# Patient Record
Sex: FEMALE | Race: WHITE | Employment: OTHER | URBAN - METROPOLITAN AREA
[De-identification: names, ages, dates, MRNs, and addresses within clinical notes are randomized per-mention and may not be internally consistent; named-entity substitution may affect disease eponyms.]

---

## 2020-05-06 ENCOUNTER — HOSPITAL ENCOUNTER (EMERGENCY)
Age: 60
Discharge: HOME | End: 2020-05-06
Attending: EMERGENCY MEDICINE
Payer: COMMERCIAL

## 2020-05-06 ENCOUNTER — APPOINTMENT (OUTPATIENT)
Dept: CT IMAGING | Age: 60
End: 2020-05-06
Attending: EMERGENCY MEDICINE
Payer: COMMERCIAL

## 2020-05-06 VITALS
BODY MASS INDEX: 32.44 KG/M2 | HEART RATE: 94 BPM | WEIGHT: 190 LBS | TEMPERATURE: 99 F | OXYGEN SATURATION: 99 % | RESPIRATION RATE: 18 BRPM | HEIGHT: 64 IN | SYSTOLIC BLOOD PRESSURE: 150 MMHG | DIASTOLIC BLOOD PRESSURE: 80 MMHG

## 2020-05-06 DIAGNOSIS — R10.32 ABDOMINAL PAIN, LLQ (LEFT LOWER QUADRANT): ICD-10-CM

## 2020-05-06 DIAGNOSIS — K57.92 DIVERTICULITIS: Primary | ICD-10-CM

## 2020-05-06 DIAGNOSIS — R11.0 NAUSEA WITHOUT VOMITING: ICD-10-CM

## 2020-05-06 LAB
ALBUMIN SERPL-MCNC: 3.9 G/DL (ref 3.5–5)
ALBUMIN/GLOB SERPL: 1.3 {RATIO} (ref 1.1–2.2)
ALP SERPL-CCNC: 95 U/L (ref 45–117)
ALT SERPL-CCNC: 24 U/L (ref 12–78)
ANION GAP SERPL CALC-SCNC: 5 MMOL/L (ref 5–15)
APPEARANCE UR: CLEAR
AST SERPL-CCNC: 17 U/L (ref 15–37)
BACTERIA URNS QL MICRO: NEGATIVE /HPF
BASOPHILS # BLD: 0.1 K/UL (ref 0–0.1)
BASOPHILS NFR BLD: 0 % (ref 0–1)
BILIRUB SERPL-MCNC: 0.8 MG/DL (ref 0.2–1)
BILIRUB UR QL: NEGATIVE
BUN SERPL-MCNC: 12 MG/DL (ref 6–20)
BUN/CREAT SERPL: 19 (ref 12–20)
CALCIUM SERPL-MCNC: 8.4 MG/DL (ref 8.5–10.1)
CHLORIDE SERPL-SCNC: 103 MMOL/L (ref 97–108)
CO2 SERPL-SCNC: 28 MMOL/L (ref 21–32)
COLOR UR: NORMAL
CREAT SERPL-MCNC: 0.64 MG/DL (ref 0.55–1.02)
DIFFERENTIAL METHOD BLD: ABNORMAL
EOSINOPHIL # BLD: 0.2 K/UL (ref 0–0.4)
EOSINOPHIL NFR BLD: 1 % (ref 0–7)
EPITH CASTS URNS QL MICRO: NORMAL /LPF
ERYTHROCYTE [DISTWIDTH] IN BLOOD BY AUTOMATED COUNT: 13.1 % (ref 11.5–14.5)
GLOBULIN SER CALC-MCNC: 3 G/DL (ref 2–4)
GLUCOSE SERPL-MCNC: 120 MG/DL (ref 65–100)
GLUCOSE UR STRIP.AUTO-MCNC: NEGATIVE MG/DL
HCT VFR BLD AUTO: 42.5 % (ref 35–47)
HGB BLD-MCNC: 14.2 G/DL (ref 11.5–16)
HGB UR QL STRIP: NEGATIVE
HYALINE CASTS URNS QL MICRO: NORMAL /LPF (ref 0–5)
IMM GRANULOCYTES # BLD AUTO: 0.1 K/UL (ref 0–0.04)
IMM GRANULOCYTES NFR BLD AUTO: 0 % (ref 0–0.5)
KETONES UR QL STRIP.AUTO: NEGATIVE MG/DL
LACTATE SERPL-SCNC: 0.5 MMOL/L (ref 0.4–2)
LEUKOCYTE ESTERASE UR QL STRIP.AUTO: NEGATIVE
LYMPHOCYTES # BLD: 0.9 K/UL (ref 0.8–3.5)
LYMPHOCYTES NFR BLD: 6 % (ref 12–49)
MCH RBC QN AUTO: 32.1 PG (ref 26–34)
MCHC RBC AUTO-ENTMCNC: 33.4 G/DL (ref 30–36.5)
MCV RBC AUTO: 95.9 FL (ref 80–99)
MONOCYTES # BLD: 1.3 K/UL (ref 0–1)
MONOCYTES NFR BLD: 9 % (ref 5–13)
NEUTS SEG # BLD: 13.1 K/UL (ref 1.8–8)
NEUTS SEG NFR BLD: 84 % (ref 32–75)
NITRITE UR QL STRIP.AUTO: NEGATIVE
NRBC # BLD: 0 K/UL (ref 0–0.01)
NRBC BLD-RTO: 0 PER 100 WBC
PH UR STRIP: 6 [PH] (ref 5–8)
PLATELET # BLD AUTO: 337 K/UL (ref 150–400)
PMV BLD AUTO: 9.6 FL (ref 8.9–12.9)
POTASSIUM SERPL-SCNC: 3.7 MMOL/L (ref 3.5–5.1)
PROT SERPL-MCNC: 6.9 G/DL (ref 6.4–8.2)
PROT UR STRIP-MCNC: NEGATIVE MG/DL
RBC # BLD AUTO: 4.43 M/UL (ref 3.8–5.2)
RBC #/AREA URNS HPF: NORMAL /HPF (ref 0–5)
SODIUM SERPL-SCNC: 136 MMOL/L (ref 136–145)
SP GR UR REFRACTOMETRY: 1 (ref 1–1.03)
UA: UC IF INDICATED,UAUC: NORMAL
UROBILINOGEN UR QL STRIP.AUTO: 0.2 EU/DL (ref 0.2–1)
WBC # BLD AUTO: 15.6 K/UL (ref 3.6–11)
WBC URNS QL MICRO: NORMAL /HPF (ref 0–4)

## 2020-05-06 PROCEDURE — 94640 AIRWAY INHALATION TREATMENT: CPT

## 2020-05-06 PROCEDURE — 83605 ASSAY OF LACTIC ACID: CPT

## 2020-05-06 PROCEDURE — 96375 TX/PRO/DX INJ NEW DRUG ADDON: CPT

## 2020-05-06 PROCEDURE — 36415 COLL VENOUS BLD VENIPUNCTURE: CPT

## 2020-05-06 PROCEDURE — 96366 THER/PROPH/DIAG IV INF ADDON: CPT

## 2020-05-06 PROCEDURE — 80053 COMPREHEN METABOLIC PANEL: CPT

## 2020-05-06 PROCEDURE — 74177 CT ABD & PELVIS W/CONTRAST: CPT

## 2020-05-06 PROCEDURE — 96368 THER/DIAG CONCURRENT INF: CPT

## 2020-05-06 PROCEDURE — 74011636320 HC RX REV CODE- 636/320: Performed by: EMERGENCY MEDICINE

## 2020-05-06 PROCEDURE — 85025 COMPLETE CBC W/AUTO DIFF WBC: CPT

## 2020-05-06 PROCEDURE — 81001 URINALYSIS AUTO W/SCOPE: CPT

## 2020-05-06 PROCEDURE — 96365 THER/PROPH/DIAG IV INF INIT: CPT

## 2020-05-06 PROCEDURE — 74011250636 HC RX REV CODE- 250/636: Performed by: EMERGENCY MEDICINE

## 2020-05-06 PROCEDURE — 74011250637 HC RX REV CODE- 250/637: Performed by: EMERGENCY MEDICINE

## 2020-05-06 PROCEDURE — 99283 EMERGENCY DEPT VISIT LOW MDM: CPT

## 2020-05-06 RX ORDER — LORAZEPAM 2 MG/ML
1 INJECTION INTRAMUSCULAR
Status: DISCONTINUED | OUTPATIENT
Start: 2020-05-06 | End: 2020-05-06 | Stop reason: HOSPADM

## 2020-05-06 RX ORDER — METRONIDAZOLE 500 MG/1
500 TABLET ORAL 3 TIMES DAILY
Qty: 30 TAB | Refills: 0 | Status: SHIPPED | OUTPATIENT
Start: 2020-05-06 | End: 2020-05-16

## 2020-05-06 RX ORDER — KETOROLAC TROMETHAMINE 30 MG/ML
15 INJECTION, SOLUTION INTRAMUSCULAR; INTRAVENOUS ONCE
Status: COMPLETED | OUTPATIENT
Start: 2020-05-06 | End: 2020-05-06

## 2020-05-06 RX ORDER — LEVOFLOXACIN 5 MG/ML
750 INJECTION, SOLUTION INTRAVENOUS
Status: COMPLETED | OUTPATIENT
Start: 2020-05-06 | End: 2020-05-06

## 2020-05-06 RX ORDER — SODIUM CHLORIDE 0.9 % (FLUSH) 0.9 %
10 SYRINGE (ML) INJECTION
Status: COMPLETED | OUTPATIENT
Start: 2020-05-06 | End: 2020-05-06

## 2020-05-06 RX ORDER — LEVOFLOXACIN 750 MG/1
750 TABLET ORAL DAILY
Qty: 5 TAB | Refills: 0 | Status: SHIPPED | OUTPATIENT
Start: 2020-05-06 | End: 2020-05-11

## 2020-05-06 RX ORDER — OXYCODONE HYDROCHLORIDE 5 MG/1
5 TABLET ORAL
Qty: 28 TAB | Refills: 0 | Status: SHIPPED | OUTPATIENT
Start: 2020-05-06 | End: 2020-05-13

## 2020-05-06 RX ORDER — MORPHINE SULFATE 2 MG/ML
4 INJECTION, SOLUTION INTRAMUSCULAR; INTRAVENOUS
Status: DISCONTINUED | OUTPATIENT
Start: 2020-05-06 | End: 2020-05-06 | Stop reason: HOSPADM

## 2020-05-06 RX ORDER — HYDROMORPHONE HYDROCHLORIDE 1 MG/ML
1 INJECTION, SOLUTION INTRAMUSCULAR; INTRAVENOUS; SUBCUTANEOUS ONCE
Status: COMPLETED | OUTPATIENT
Start: 2020-05-06 | End: 2020-05-06

## 2020-05-06 RX ORDER — ALBUTEROL SULFATE 90 UG/1
2 AEROSOL, METERED RESPIRATORY (INHALATION)
Status: COMPLETED | OUTPATIENT
Start: 2020-05-06 | End: 2020-05-06

## 2020-05-06 RX ORDER — METRONIDAZOLE 500 MG/100ML
500 INJECTION, SOLUTION INTRAVENOUS
Status: COMPLETED | OUTPATIENT
Start: 2020-05-06 | End: 2020-05-06

## 2020-05-06 RX ORDER — TRAZODONE HYDROCHLORIDE 50 MG/1
50 TABLET ORAL
COMMUNITY

## 2020-05-06 RX ORDER — ONDANSETRON 2 MG/ML
4 INJECTION INTRAMUSCULAR; INTRAVENOUS
Status: DISCONTINUED | OUTPATIENT
Start: 2020-05-06 | End: 2020-05-06 | Stop reason: HOSPADM

## 2020-05-06 RX ADMIN — MORPHINE SULFATE 4 MG: 2 INJECTION, SOLUTION INTRAMUSCULAR; INTRAVENOUS at 15:46

## 2020-05-06 RX ADMIN — ALBUTEROL SULFATE 2 PUFF: 90 AEROSOL, METERED RESPIRATORY (INHALATION) at 16:00

## 2020-05-06 RX ADMIN — LEVOFLOXACIN 750 MG: 5 INJECTION, SOLUTION INTRAVENOUS at 18:25

## 2020-05-06 RX ADMIN — KETOROLAC TROMETHAMINE 15 MG: 30 INJECTION, SOLUTION INTRAMUSCULAR at 15:46

## 2020-05-06 RX ADMIN — METRONIDAZOLE 500 MG: 500 INJECTION, SOLUTION INTRAVENOUS at 17:04

## 2020-05-06 RX ADMIN — Medication 10 ML: at 15:23

## 2020-05-06 RX ADMIN — HYDROMORPHONE HYDROCHLORIDE 1 MG: 1 INJECTION, SOLUTION INTRAMUSCULAR; INTRAVENOUS; SUBCUTANEOUS at 16:00

## 2020-05-06 RX ADMIN — IOPAMIDOL 100 ML: 755 INJECTION, SOLUTION INTRAVENOUS at 15:23

## 2020-05-06 NOTE — ED NOTES
Pt presents from home w/ c/o LLQ pain. Pt reports getting bit by a brown snake on Saturday and waking up Sunday w/ severe abdominal pain. Pt has spoken to her primary care provider and was given medication for diverticulitis flare up.

## 2020-05-06 NOTE — PROGRESS NOTES
Nurse Clarification of the Prior to Admission Medication Regimen     The patient was interviewed regarding clarification of the prior to admission medication regimen. The individual(s) listed above were questioned regarding the patient's use of any other inhalers, topical products, over the counter medications, herbal medications, vitamin products or ophthalmic/nasal/otic medication use. Information Obtained From: patient    Recommendations/Findings: The following amendments were made to the patient's active medication list on file at [address]:     1) Additions:    trazodone 50 mg qhs prn    2) Removals:    none    3) Changes:   none       PTA medication list was corrected to the following:     Prior to Admission Medications   Prescriptions Last Dose Informant Taking? ALPRAZolam (XANAX) 0.25 mg tablet   No   Sig: Take 0.25 mg by mouth nightly as needed for Anxiety. albuterol (VENTOLIN HFA) 90 mcg/actuation inhaler   No   Sig: Take 2 Puffs by inhalation every four (4) hours as needed for Wheezing. ibuprofen (MOTRIN) 600 mg tablet   No   Sig: Take 1 Tab by mouth every eight (8) hours as needed for Pain.   levothyroxine (SYNTHROID) 200 mcg tablet   No   Sig: Take  by mouth Daily (before breakfast). pantoprazole (PROTONIX) 20 mg tablet   No   Sig: Take 20 mg by mouth daily. traZODone (DESYREL) 50 mg tablet   Yes   Sig: Take 50 mg by mouth nightly as needed for Sleep.       Facility-Administered Medications: None        Thank you,  Brandon Maxwell

## 2020-05-06 NOTE — ED PROVIDER NOTES
EMERGENCY DEPARTMENT HISTORY AND PHYSICAL EXAM      Date: 5/6/2020  Patient Name: Addie Duong    History of Presenting Illness     Chief Complaint   Patient presents with    Abdominal Pain     Pain across low abd for 10 days, on AB for diverticulitis, pain now worse at LLQ with N/D. Pain sharp and constant       History Provided By: Patient    HPI: Addie Duong, 64 y.o. female  With past medical history of asthma, Graves' disease presenting today with severe left lower quadrant abdominal pain. The patient reports that she has had pain across the lower abdomen for about 10 days. She is on antibiotics for diverticulitis. She notes that she has had worsened left lower quadrant abdominal pain for around 3 days. She states that she has nausea and diarrhea. She states that the pain is extremely sharp and constant. It radiates around her entire abdomen. No fevers or chills associated with this. No other exacerbating or alleviating symptoms. There are no other complaints, changes, or physical findings at this time. PCP: Karis Cartwright MD    No current facility-administered medications on file prior to encounter. Current Outpatient Medications on File Prior to Encounter   Medication Sig Dispense Refill    traZODone (DESYREL) 50 mg tablet Take 50 mg by mouth nightly as needed for Sleep.  ALPRAZolam (XANAX) 0.25 mg tablet Take 0.25 mg by mouth nightly as needed for Anxiety.  ibuprofen (MOTRIN) 600 mg tablet Take 1 Tab by mouth every eight (8) hours as needed for Pain. 20 Tab 0    levothyroxine (SYNTHROID) 200 mcg tablet Take  by mouth Daily (before breakfast).  pantoprazole (PROTONIX) 20 mg tablet Take 20 mg by mouth daily.  albuterol (VENTOLIN HFA) 90 mcg/actuation inhaler Take 2 Puffs by inhalation every four (4) hours as needed for Wheezing.          Past History     Past Medical History:  Past Medical History:   Diagnosis Date    Asthma     Graves disease     Other ill-defined conditions(799.89)     thyroid dysfunction       Past Surgical History:  Past Surgical History:   Procedure Laterality Date    HX ORTHOPAEDIC  foot injury, left        Family History:  No family history on file. Social History:  Social History     Tobacco Use    Smoking status: Former Smoker    Smokeless tobacco: Never Used   Substance Use Topics    Alcohol use: Yes     Comment: social    Drug use: No       Allergies: Allergies   Allergen Reactions    Pcn [Penicillins] Other (comments)     Hair fell out         Review of Systems   Constitutional: No  fever  Skin: No  rash  HEENT: No  nasal congestion  Resp: No cough  CV: No chest pain  GI: No vomiting, + nausea  : No dysuria  MSK: No joint pain  Neuro: No numbness  Psych: No suicidal      Physical Exam     Patient Vitals for the past 12 hrs:   Temp Pulse Resp BP SpO2   05/06/20 1600  94  150/80    05/06/20 1321 99 °F (37.2 °C) 92 18 (!) 112/93 99 %     General: alert, moderate distress, crying, appears uncomfortable and in pain  Eyes: EOMI, normal conjunctiva  ENT: moist mucous membranes. Neck: Active, full ROM of neck. Skin: No rashes. no jaundice              Lungs: Equal chest expansion. no respiratory distress. clear to auscultation bilaterally No accessory muscle usage  Heart: regular rate     no peripheral edema   2+ radial pulses and DPs bilaterally  Abd:  non distended soft, Tender in the LLQ.+ rebound tenderness. + involuntary guarding  Back: Full ROM  MSK: Full, active ROM in all 4 extremities.    Neuro: Person, Place, Time and Situation; normal speech;   Psych: Cooperative with exam; Appropriate mood and affect             Diagnostic Study Results     Labs -     Recent Results (from the past 12 hour(s))   METABOLIC PANEL, COMPREHENSIVE    Collection Time: 05/06/20  1:31 PM   Result Value Ref Range    Sodium 136 136 - 145 mmol/L    Potassium 3.7 3.5 - 5.1 mmol/L    Chloride 103 97 - 108 mmol/L    CO2 28 21 - 32 mmol/L Anion gap 5 5 - 15 mmol/L    Glucose 120 (H) 65 - 100 mg/dL    BUN 12 6 - 20 MG/DL    Creatinine 0.64 0.55 - 1.02 MG/DL    BUN/Creatinine ratio 19 12 - 20      GFR est AA >60 >60 ml/min/1.73m2    GFR est non-AA >60 >60 ml/min/1.73m2    Calcium 8.4 (L) 8.5 - 10.1 MG/DL    Bilirubin, total 0.8 0.2 - 1.0 MG/DL    ALT (SGPT) 24 12 - 78 U/L    AST (SGOT) 17 15 - 37 U/L    Alk. phosphatase 95 45 - 117 U/L    Protein, total 6.9 6.4 - 8.2 g/dL    Albumin 3.9 3.5 - 5.0 g/dL    Globulin 3.0 2.0 - 4.0 g/dL    A-G Ratio 1.3 1.1 - 2.2     CBC WITH AUTOMATED DIFF    Collection Time: 05/06/20  1:31 PM   Result Value Ref Range    WBC 15.6 (H) 3.6 - 11.0 K/uL    RBC 4.43 3.80 - 5.20 M/uL    HGB 14.2 11.5 - 16.0 g/dL    HCT 42.5 35.0 - 47.0 %    MCV 95.9 80.0 - 99.0 FL    MCH 32.1 26.0 - 34.0 PG    MCHC 33.4 30.0 - 36.5 g/dL    RDW 13.1 11.5 - 14.5 %    PLATELET 546 312 - 128 K/uL    MPV 9.6 8.9 - 12.9 FL    NRBC 0.0 0  WBC    ABSOLUTE NRBC 0.00 0.00 - 0.01 K/uL    NEUTROPHILS 84 (H) 32 - 75 %    LYMPHOCYTES 6 (L) 12 - 49 %    MONOCYTES 9 5 - 13 %    EOSINOPHILS 1 0 - 7 %    BASOPHILS 0 0 - 1 %    IMMATURE GRANULOCYTES 0 0.0 - 0.5 %    ABS. NEUTROPHILS 13.1 (H) 1.8 - 8.0 K/UL    ABS. LYMPHOCYTES 0.9 0.8 - 3.5 K/UL    ABS. MONOCYTES 1.3 (H) 0.0 - 1.0 K/UL    ABS. EOSINOPHILS 0.2 0.0 - 0.4 K/UL    ABS. BASOPHILS 0.1 0.0 - 0.1 K/UL    ABS. IMM.  GRANS. 0.1 (H) 0.00 - 0.04 K/UL    DF AUTOMATED     LACTIC ACID    Collection Time: 05/06/20  4:27 PM   Result Value Ref Range    Lactic acid 0.5 0.4 - 2.0 MMOL/L   URINALYSIS W/ REFLEX CULTURE    Collection Time: 05/06/20  6:29 PM   Result Value Ref Range    Color YELLOW/STRAW      Appearance CLEAR CLEAR      Specific gravity 1.005 1.003 - 1.030      pH (UA) 6.0 5.0 - 8.0      Protein Negative NEG mg/dL    Glucose Negative NEG mg/dL    Ketone Negative NEG mg/dL    Bilirubin Negative NEG      Blood Negative NEG      Urobilinogen 0.2 0.2 - 1.0 EU/dL    Nitrites Negative NEG      Leukocyte Esterase Negative NEG      UA:UC IF INDICATED CULTURE NOT INDICATED BY UA RESULT CNI      WBC 0-4 0 - 4 /hpf    RBC 0-5 0 - 5 /hpf    Epithelial cells FEW FEW /lpf    Bacteria Negative NEG /hpf    Hyaline cast 0-2 0 - 5 /lpf       Radiologic Studies -   CT ABD PELV W CONT   Final Result   IMPRESSION: Distal descending colitis is favored over descending-sigmoid colon   junction diverticulitis. CT abdomen pelvis with IV contrast is recommended 8-12   weeks after treatment to show resolution and exclude underlying mass lesion. CT Results  (Last 48 hours)               05/06/20 1523  CT ABD PELV W CONT Final result    Impression:  IMPRESSION: Distal descending colitis is favored over descending-sigmoid colon   junction diverticulitis. CT abdomen pelvis with IV contrast is recommended 8-12   weeks after treatment to show resolution and exclude underlying mass lesion. Narrative:  CT ABDOMEN AND PELVIS WITH CONTRAST. 5/6/2020 3:23 PM        INDICATION: Left lower quadrant abdominal pain. COMPARISON: 2/13/2006. TECHNIQUE: CT of the abdomen and pelvis was performed after the administration   of 100 cc IV Isovue-370. CT dose reduction was achieved through use of a   standardized protocol tailored for this examination and automatic exposure   control for dose modulation. FINDINGS:   Abdomen: The right renal collecting system is duplicated, and the ureters are   indicated at least proximally. This is a normal variant. Subcentimeter hypodense   hepatic and renal lesions are too small to characterize, but likely represent   cysts. The lung bases are clear. The heart size is normal. The distal esophagus,   stomach, duodenum, liver, gallbladder, pancreas, spleen, adrenals, and kidneys   are otherwise normal.       Pelvis: Colonic wall thickening extends over approximately 10 cm of the distal   descending colon to the descending-sigmoid junction.  Paracolic fat inflammation   and trace ascites are associated. Though there are numerous diverticula of   varying size throughout the descending and sigmoid colon, none of these is   clearly focally inflamed. The relatively long segment of colonic wall thickening   also favors colitis over diverticulitis. There is moderate constipation in the   upstream colon. The small bowel, ileocecal junction, and bladder are normal. The appendix is not   visualized; no pericecal inflammatory process. No free air and no abdominopelvic   lymphadenopathy. CXR Results  (Last 48 hours)    None          Medical Decision Making   I am the first provider for this patient. I reviewed the vital signs, available nursing notes, past medical history, past surgical history, family history and social history. Provider Notes (Medical Decision Making):     Differential Diagnosis: Perforated diverticulitis, diverticulitis, UTI, electrolyte derangement, colitis    Initial Plan: Labs, CT scan, will treat with IV morphine and IV Toradol. The patient appears extremely uncomfortable, she has involuntary guarding her left lower quadrant concern for possible perforated diverticulitis. ED Course:   Initial assessment performed. The patients presenting problems have been discussed, and they are in agreement with the care plan formulated and outlined with them. I have encouraged them to ask questions as they arise throughout their visit. ED Course as of May 06 2225   Wed May 06, 2020   1553 The patient continues having abdominal pain, will treat with Dilaudid. [NW]   2002 Interpretation of the patient's laboratory studies her lactate is negative, urinalysis negative, white blood cell count is elevated, but otherwise unremarkable electrolytes. [NW]   2002 On my interpretation the patient CT she has left lower quadrant diverticulitis. No perforation or abscess. [NW]   2003 Patient presenting today with severe left lower quadrant abdominal pain.   The patient has evidence of diverticulitis without complication. She has significant improvement in her pain after treatment with IV pain medication including 1 mg of Dilaudid. The patient has been on antibiotics which she states with ciprofloxacin and Flagyl. I will place her on Levaquin and Flagyl. I told the patient that given she has been on antibiotics she could have been admitted to the hospital, but she does not wish to be admitted. I discussed return precautions with her and will continue antibiotics as an outpatient. Patient understands and is discharged. [NW]      ED Course User Index  [NW] Hugo Scanlon MD       I, Facundo Fowler MD, am the attending of record for this patient encounter. Dispo: Discharged. The patient has been re-evaluated and is ready for discharge. Reviewed available results with patient. Counseled patient on diagnosis and care plan. Patient has expressed understanding, and all questions have been answered. Patient agrees with plan and agrees to follow up as recommended, or to return to the ED if their symptoms worsen. Discharge instructions have been provided and explained to the patient, along with reasons to return to the ED. PLAN:  Discharge Medication List as of 5/6/2020  8:04 PM      START taking these medications    Details   levoFLOXacin (Levaquin) 750 mg tablet Take 1 Tab by mouth daily for 5 days. , Print, Disp-5 Tab, R-0      metroNIDAZOLE (FlagyL) 500 mg tablet Take 1 Tab by mouth three (3) times daily for 10 days. , Print, Disp-30 Tab, R-0         CONTINUE these medications which have NOT CHANGED    Details   traZODone (DESYREL) 50 mg tablet Take 50 mg by mouth nightly as needed for Sleep., Historical Med      ALPRAZolam (XANAX) 0.25 mg tablet Take 0.25 mg by mouth nightly as needed for Anxiety. , Historical Med      ibuprofen (MOTRIN) 600 mg tablet Take 1 Tab by mouth every eight (8) hours as needed for Pain., Print, Disp-20 Tab, R-0      levothyroxine (SYNTHROID) 200 mcg tablet Take  by mouth Daily (before breakfast). , Historical Med      pantoprazole (PROTONIX) 20 mg tablet Take 20 mg by mouth daily. , Historical Med      albuterol (VENTOLIN HFA) 90 mcg/actuation inhaler Take 2 Puffs by inhalation every four (4) hours as needed for Wheezing., Historical Med         1.   2.     Follow-up Information     Follow up With Specialties Details Why Contact Info    Boni Yusuf MD Family Practice  As needed [address] [place] [address]  [phone]          3. Return to ED if worse       Diagnosis     Clinical Impression:   1. Diverticulitis    2. Abdominal pain, LLQ (left lower quadrant)    3. Nausea without vomiting        Attestations:    Gary Magana MD    Please note that this dictation was completed with [employer], the computer voice recognition software. Quite often unanticipated grammatical, syntax, homophones, and other interpretive errors are inadvertently transcribed by the computer software. Please disregard these errors. Please excuse any errors that have escaped final proofreading. Thank you.

## 2020-05-07 ENCOUNTER — PATIENT OUTREACH (OUTPATIENT)
Dept: FAMILY MEDICINE CLINIC | Age: 60
End: 2020-05-07

## 2020-05-07 NOTE — PROGRESS NOTES
20 Patient contacted regarding recent discharge and COVID-19 risk   Care Transition Nurse/ [address] [place] [address] contacted the patient by telephone to perform post discharge assessment. Verified name and  with patient as identifiers. Patient has following risk factors of: asthma. CTN/ACM reviewed discharge instructions, medical action plan and red flags related to discharge diagnosis. Reviewed and educated them on any new and changed medications related to discharge diagnosis. Advised obtaining a 90-day supply of all daily and as-needed medications. Patient stated she had picked up her medications and was taking them as prescribed/    Patient stated that she did not want to talk any more. She is still having some pain. Patient reported she is contacting her GI specialist, Vika Valencia. Patient did not want covid 19 education at this time. given an opportunity for questions and concerns. The patient agrees to contact [facility] office for questions related to their healthcare. CTN/ACM provided contact information for future reference. Patient hung up phone and was not available on call back. The following was not discussed. From CDC: Are you at higher risk for severe illness?  Wash your hands often.  Avoid close contact (6 feet, which is about two arm lengths) with people who are sick.  Put distance between yourself and other people if COVID-19 is spreading in your community.  Clean and disinfect frequently touched surfaces.  Avoid all cruise travel and non-essential air travel.  Call your healthcare professional if you have concerns about COVID-19 and your underlying condition or if you are sick. Patient/family/caregiver given information for [facility] and agrees to enroll no  Patient's preferred e-mail:  n/a  Patient's preferred phone number: n/a  Plan for follow-up call in 5-7 days based on severity of symptoms and risk factors.  [facility]    20 attempted to contact patient- patient answered phone and requested ACM call back later as she was sleeping at this time.  CLAIRB

## 2020-05-15 ENCOUNTER — PATIENT OUTREACH (OUTPATIENT)
Dept: FAMILY MEDICINE CLINIC | Age: 60
End: 2020-05-15

## 2020-05-15 NOTE — PROGRESS NOTES
5/15/20 ACM attempted to contact patient as follow up to ER visit about 1 week ago. ACM left messages on contact numbers provided for patient for patient call back.  CLAIRB

## 2020-05-22 ENCOUNTER — PATIENT OUTREACH (OUTPATIENT)
Dept: FAMILY MEDICINE CLINIC | Age: 60
End: 2020-05-22

## 2020-05-22 NOTE — PROGRESS NOTES
Patient resolved from Transition of Care episode on 5/22/20. ACM/CTN was unsuccessful at contacting this patient today. Patient/family was provided the following resources and education related to COVID-19 during the initial call:                         Signs, symptoms and red flags related to COVID-19            CDC exposure and quarantine guidelines            [employer] exposure contact - [phone]            Contact for their local Department of Health                 Patient has not had any additional ED or hospital visits. No further outreach scheduled with this CTN/ACM. Episode of Care resolved. Patient has this CTN/ACM contact information if future needs arise.  DMB

## 2021-09-11 ENCOUNTER — TRANSCRIBE ORDER (OUTPATIENT)
Dept: SCHEDULING | Age: 61
End: 2021-09-11

## 2021-09-11 DIAGNOSIS — R10.32 ABDOMINAL PAIN, LLQ: Primary | ICD-10-CM

## 2021-09-22 ENCOUNTER — HOSPITAL ENCOUNTER (OUTPATIENT)
Dept: CT IMAGING | Age: 61
Discharge: HOME | End: 2021-09-22
Attending: NURSE PRACTITIONER
Payer: COMMERCIAL

## 2021-09-22 DIAGNOSIS — R10.32 ABDOMINAL PAIN, LLQ: ICD-10-CM

## 2021-09-22 PROCEDURE — 74177 CT ABD & PELVIS W/CONTRAST: CPT

## 2021-09-22 PROCEDURE — 74011000250 HC RX REV CODE- 250: Performed by: NURSE PRACTITIONER

## 2021-09-22 PROCEDURE — 74011000636 HC RX REV CODE- 636: Performed by: NURSE PRACTITIONER

## 2021-09-22 RX ORDER — BARIUM SULFATE 20 MG/ML
900 SUSPENSION ORAL ONCE
Status: COMPLETED | OUTPATIENT
Start: 2021-09-22 | End: 2021-09-22

## 2021-09-22 RX ADMIN — IOPAMIDOL 100 ML: 755 INJECTION, SOLUTION INTRAVENOUS at 09:25

## 2021-09-22 RX ADMIN — BARIUM SULFATE 900 ML: 20 SUSPENSION ORAL at 09:25

## 2022-03-16 ENCOUNTER — OFFICE VISIT (OUTPATIENT)
Dept: SURGERY | Age: 62
End: 2022-03-16
Payer: COMMERCIAL

## 2022-03-16 VITALS
HEART RATE: 88 BPM | RESPIRATION RATE: 18 BRPM | HEIGHT: 64 IN | SYSTOLIC BLOOD PRESSURE: 134 MMHG | DIASTOLIC BLOOD PRESSURE: 87 MMHG | BODY MASS INDEX: 34.97 KG/M2 | TEMPERATURE: 98.3 F | OXYGEN SATURATION: 95 % | WEIGHT: 204.8 LBS

## 2022-03-16 DIAGNOSIS — K43.2 RECURRENT INCISIONAL HERNIA: Primary | ICD-10-CM

## 2022-03-16 DIAGNOSIS — E66.01 SEVERE OBESITY (BMI 35.0-35.9 WITH COMORBIDITY) (HCC): ICD-10-CM

## 2022-03-16 PROCEDURE — 99204 OFFICE O/P NEW MOD 45 MIN: CPT | Performed by: SURGERY

## 2022-03-16 NOTE — PROGRESS NOTES
1. Have you been to the ER, urgent care clinic since your last visit? Hospitalized since your last visit? No    2. Have you seen or consulted any other health care providers outside of the [address] [place] [address] since your last visit? Include any pap smears or colon screening.  No

## 2022-03-16 NOTE — LETTER
3/17/2022    Patient: Sarai Ken   YOB: 1960   Date of Visit: 3/16/2022     Maurice Garza [facility] [address]  Via Fax: [phone]    Dear Maurice Garza MD,      Thank you for referring Ms. Sarai Ken to [facility] [address] for evaluation. My notes for this consultation are attached. If you have questions, please do not hesitate to call me. I look forward to following your patient along with you.       Sincerely,    Behzad Galloway MD

## 2022-03-17 NOTE — PROGRESS NOTES
[facility] at [facility] Surgery History and Physical    History of Present Illness:      Mortimer Rummer is a 63 y.o. female who appears to have multiple incisional hernias. She has a history of exploratory laparotomy sigmoid resection for perforated diverticulitis with colostomy then had a colostomy takedown. She also had a laparoscopic exploration for abdominal pain afterwards. She subsequently had a incisional hernia repair done at [facility] by Dr. Jorge A Tinajero. She describes that he did use mesh in the repair. She appears to have a hernia in the upper midline lower midline and at the colostomy site. She says she has a lot of abdominal pain related to the hernias. She has pain with straining lifting and sometimes randomly. The pain is a 5-6 out of 10 when she has it. Past Medical History:   Diagnosis Date    Asthma     Chronic pain     GERD (gastroesophageal reflux disease)     Graves disease     Other ill-defined conditions(519.89)     thyroid dysfunction    Stool color black        Past Surgical History:   Procedure Laterality Date    HX HERNIA REPAIR  2020    HX ORTHOPAEDIC  foot injury, left          Current Outpatient Medications:     traZODone (DESYREL) 50 mg tablet, Take 50 mg by mouth nightly as needed for Sleep., Disp: , Rfl:     levothyroxine (SYNTHROID) 200 mcg tablet, Take  by mouth Daily (before breakfast). , Disp: , Rfl:     pantoprazole (PROTONIX) 20 mg tablet, Take 20 mg by mouth daily. , Disp: , Rfl:     albuterol (VENTOLIN HFA) 90 mcg/actuation inhaler, Take 2 Puffs by inhalation every four (4) hours as needed for Wheezing., Disp: , Rfl:     ALPRAZolam (XANAX) 0.25 mg tablet, Take 0.25 mg by mouth nightly as needed for Anxiety. (Patient not taking: Reported on 3/16/2022), Disp: , Rfl:     ibuprofen (MOTRIN) 600 mg tablet, Take 1 Tab by mouth every eight (8) hours as needed for Pain.  (Patient not taking: Reported on 3/16/2022), Disp: 20 Tab, Rfl: 0    Allergies   Allergen Reactions    Pcn [Penicillins] Other (comments)     Hair fell out       Social History     Socioeconomic History    Marital status:      Spouse name: Not on file    Number of children: Not on file    Years of education: Not on file    Highest education level: Not on file   Occupational History    Not on file   Tobacco Use    Smoking status: Former Smoker    Smokeless tobacco: Never Used   Substance and Sexual Activity    Alcohol use: Yes     Comment: social    Drug use: No    Sexual activity: Yes     Partners: Male   Other Topics Concern    Not on file   Social History Narrative    Not on file     Social Determinants of Health     Financial Resource Strain:     Difficulty of Paying Living Expenses: Not on file   Food Insecurity:     Worried About Running Out of Food in the Last Year: Not on file    Michelle of Food in the Last Year: Not on file   Transportation Needs:     Lack of Transportation (Medical): Not on file    Lack of Transportation (Non-Medical):  Not on file   Physical Activity:     Days of Exercise per Week: Not on file    Minutes of Exercise per Session: Not on file   Stress:     Feeling of Stress : Not on file   Social Connections:     Frequency of Communication with Friends and Family: Not on file    Frequency of Social Gatherings with Friends and Family: Not on file    Attends Orthodoxy Services: Not on file    Active Member of [address] [place] [address] [employer] or Organizations: Not on file    Attends Club or Organization Meetings: Not on file    Marital Status: Not on file   Intimate Partner Violence:     Fear of Current or Ex-Partner: Not on file    Emotionally Abused: Not on file    Physically Abused: Not on file    Sexually Abused: Not on file   Housing Stability:     Unable to Pay for Housing in the Last Year: Not on file    Number of [place] in the Last Year: Not on file    Unstable Housing in the Last Year: Not on file       No family history on file.    ROS   Constitutional: negative  Ears, Nose, Mouth, Throat, and Face: negative  Respiratory: negative  Cardiovascular: negative  Gastrointestinal: positive for abdominal pain and Multiple incisional hernias  Genitourinary:negative  Integument/Breast: negative  Hematologic/Lymphatic: negative  Behavioral/Psychiatric: negative  Allergic/Immunologic: negative      Physical Exam:     Visit Vitals  /87 (BP 1 Location: Right arm, BP Patient Position: Sitting, BP Cuff Size: Adult)   Pulse 88   Temp 98.3 °F (36.8 °C) (Oral)   Resp 18   Ht 5' 4\" (1.626 m)   Wt 204 lb 12.8 oz (92.9 kg)   SpO2 95%   BMI 35.15 kg/m²       General - alert and oriented, no apparent distress  HEENT - no jaundice, no hearing imparement  Pulm - CTAB, no C/W/R  CV - RRR, no M/R/G  Abd -soft, nondistended, bowel sounds present, palpable upper midline hernia lower midline hernia and colostomy site hernia all of which are small and appear to just have fatty contents  Ext - pulses intact in UE and LE bilaterally, no edema  Skin - supple, no rashes  Psychiatric - normal affect, good mood    Labs  Lab Results   Component Value Date/Time    Sodium 136 05/06/2020 01:31 PM    Potassium 3.7 05/06/2020 01:31 PM    Chloride 103 05/06/2020 01:31 PM    CO2 28 05/06/2020 01:31 PM    Anion gap 5 05/06/2020 01:31 PM    Glucose 120 (H) 05/06/2020 01:31 PM    BUN 12 05/06/2020 01:31 PM    Creatinine 0.64 05/06/2020 01:31 PM    BUN/Creatinine ratio 19 05/06/2020 01:31 PM    GFR est AA >60 05/06/2020 01:31 PM    GFR est non-AA >60 05/06/2020 01:31 PM    Calcium 8.4 (L) 05/06/2020 01:31 PM    Bilirubin, total 0.8 05/06/2020 01:31 PM    Alk.  phosphatase 95 05/06/2020 01:31 PM    Protein, total 6.9 05/06/2020 01:31 PM    Albumin 3.9 05/06/2020 01:31 PM    Globulin 3.0 05/06/2020 01:31 PM    A-G Ratio 1.3 05/06/2020 01:31 PM    ALT (SGPT) 24 05/06/2020 01:31 PM    AST (SGOT) 17 05/06/2020 01:31 PM     Lab Results   Component Value Date/Time    WBC 15.6 (H) 05/06/2020 01:31 PM    HGB 14.2 05/06/2020 01:31 PM    HCT 42.5 05/06/2020 01:31 PM    PLATELET 881 04/18/2354 01:31 PM    MCV 95.9 05/06/2020 01:31 PM         Imaging  CT abdomen pelvis-FINDINGS:   LOWER THORAX: No significant abnormality in the incidentally imaged lower chest.  LIVER: No mass. BILIARY TREE: Gallbladder is within normal limits. CBD is not dilated. SPLEEN: within normal limits. PANCREAS: No mass or ductal dilatation. ADRENALS: Unremarkable. KIDNEYS: Normal renal enhancement. No stones or hydronephrosis. Duplicated right  renal collecting system. STOMACH: Unremarkable. SMALL BOWEL: No dilatation or wall thickening. COLON: No dilatation or wall thickening. APPENDIX: Nonvisualized  PERITONEUM: Surgical clips in the mesentery in the left mid abdomen and left  lower quadrant. RETROPERITONEUM: No lymphadenopathy or aortic aneurysm. REPRODUCTIVE ORGANS: Within normal limits. URINARY BLADDER: No mass or calculus. BONES: No destructive bone lesion. ABDOMINAL WALL: Fat-containing incisional hernia in the left mid abdomen without  evidence of incarceration. Fat-containing ventral hernia noted at the level of  the transverse colon, without evidence of incarceration. Fat-containing midline  ventral hernia in the lower abdomen without evidence of incarceration. ADDITIONAL COMMENTS: N/A     IMPRESSION  Post surgical changes in the abdomen without evidence of bowel obstruction or  acute cause for left lower quadrant pain. Fat-containing incisional hernias as noted above. I have reviewed and agree with all of the pertinent images    Assessment:     Dorian Alves is a 63 y.o. female with multiple recurrent incisional hernias    Recommendations:     1. On CT scan it appears that she has a recurrent incisional hernia in the upper midline and lower midline. It appears that she may have a little bit of diastases as well.   In the left lower quadrant colostomy site she has a small bulge in that area but her colostomy has been taken down. The CT shows these hernias to be almost more of a bulge of the abdominal wall than a major hernia. She does have some associated diastases of the midline as well. She says she had a previous repair with mesh and I will need to get the operation note from her regular doctors before I can plan surgery. I would also like her to lose about 15 to 20 pounds before considering doing surgery. She will come back to see me in a few months after she has lost weight and I have gotten the operation note. Jennifer Barreto MD    Greater than half of the time: 45 minutes was used in counciling the patient about diagnosis and treatment plan    Ms. Hyacinth Reno has a reminder for a \"due or due soon\" health maintenance. I have asked that she contact her primary care provider for follow-up on this health maintenance.

## 2022-03-30 DIAGNOSIS — Z76.89 ENCOUNTER FOR WEIGHT MANAGEMENT: Primary | ICD-10-CM

## 2022-03-30 DIAGNOSIS — E66.01 MORBID OBESITY (HCC): ICD-10-CM

## 2022-03-30 NOTE — PROGRESS NOTES
Patient was emailed a pre-recorded version of our VIRTUAL Medically Supervised Weight Loss New Patient Orientation today where we discussed:  New Direction Very Low and the Low Calorie diet details  Medical Supervision  Nutrition education  Cost of Meal Replacements  Policies and compliance required for program enrollment.

## 2022-04-19 ENCOUNTER — HOSPITAL ENCOUNTER (OUTPATIENT)
Dept: INFUSION THERAPY | Age: 62
Discharge: HOME | End: 2022-04-19

## 2022-05-03 ENCOUNTER — APPOINTMENT (OUTPATIENT)
Dept: INFUSION THERAPY | Age: 62
End: 2022-05-03

## 2022-05-23 ENCOUNTER — TELEPHONE (OUTPATIENT)
Dept: SURGERY | Age: 62
End: 2022-05-23

## 2022-05-23 NOTE — TELEPHONE ENCOUNTER
Pt requesting to speak to nurse about a plan of care. Is wondering if Dr. Moraima Choi had spoken with Dr. Delaney Briceno yet about planning an upcoming hernia surgery together.  Pt requesting a call back for an update

## 2022-05-23 NOTE — TELEPHONE ENCOUNTER
Reviewed notes patient was seen by Dr Seema Ocampo on 3/16/22 for recurrent incisional hernia in the upper midline and lower midline. Returned call to Ms Lincoln Munoz verified all PHI. MD recommended she had a previous repair with mesh and I will need to get the operation note from her regular doctors before I can plan surgery. I would also like her to lose about 15 to 20 pounds before considering doing surgery. She will come back to see me in a few months after she has lost weight and I have gotten the operation note.     Received the operative notes from Dr Rajiv Murphy. I advised  Ms Lincoln Munoz to schedule a follow up appointment with Dr Seema Ocampo to discuss the plan. Ms Lincoln Munoz stated she is out of town it probably will be a month or so when she can schedule appointment.

## 2022-05-23 NOTE — TELEPHONE ENCOUNTER
Per Nurse SAVANNAH, called patient to make her aware that we cannot do virtual visits with patients who are out of the [facility] during the visit. Left patient a voicemail explaining this and requested the patient call back to reschedule.

## 2022-06-15 ENCOUNTER — VIRTUAL VISIT (OUTPATIENT)
Dept: SURGERY | Age: 62
End: 2022-06-15

## 2022-06-15 DIAGNOSIS — E66.01 SEVERE OBESITY (BMI 35.0-35.9 WITH COMORBIDITY) (HCC): Primary | ICD-10-CM

## 2022-09-13 ENCOUNTER — OFFICE VISIT (OUTPATIENT)
Dept: SURGERY | Age: 62
End: 2022-09-13
Payer: COMMERCIAL

## 2022-09-13 VITALS
HEIGHT: 64 IN | SYSTOLIC BLOOD PRESSURE: 128 MMHG | RESPIRATION RATE: 18 BRPM | WEIGHT: 182 LBS | OXYGEN SATURATION: 94 % | DIASTOLIC BLOOD PRESSURE: 87 MMHG | BODY MASS INDEX: 31.07 KG/M2 | TEMPERATURE: 97.3 F | HEART RATE: 76 BPM

## 2022-09-13 DIAGNOSIS — E66.01 SEVERE OBESITY (BMI 35.0-35.9 WITH COMORBIDITY) (HCC): ICD-10-CM

## 2022-09-13 DIAGNOSIS — K43.2 RECURRENT INCISIONAL HERNIA: Primary | ICD-10-CM

## 2022-09-13 PROCEDURE — 99214 OFFICE O/P EST MOD 30 MIN: CPT | Performed by: SURGERY

## 2022-09-13 NOTE — PROGRESS NOTES
[facility] at [facility] Surgery History and Physical    History of Present Illness:      Albert Mckinney is a 63 y.o. female who appears to have multiple incisional hernias. She has a history of exploratory laparotomy sigmoid resection for perforated diverticulitis with colostomy then had a colostomy takedown. She also had a laparoscopic exploration for abdominal pain afterwards. She subsequently had a incisional hernia repair done at [facility] by Dr. Eloise Chicas. She describes that he did use mesh in the repair. She appears to have a hernia in the upper midline lower midline and at the colostomy site. She says she has a lot of abdominal pain related to the hernias. She has pain with straining lifting and sometimes randomly. The pain is a 5-6 out of 10 when she has it. She has now lost 20+ pounds since I saw her a number of months ago. I have gotten the reports from Vipul Westfall. Looking back at her previous hernia repair she had a robotic IPOM style repair with a 15 cm intraperitoneal mesh by Dr. Rj Bah at [facility]    Past Medical History:   Diagnosis Date    Asthma     Chronic pain     GERD (gastroesophageal reflux disease)     Graves disease     Other ill-defined conditions(649.24)     thyroid dysfunction    Stool color black        Past Surgical History:   Procedure Laterality Date    HX HERNIA REPAIR  2020    HX ORTHOPAEDIC  foot injury, left          Current Outpatient Medications:     traZODone (DESYREL) 50 mg tablet, Take 50 mg by mouth nightly as needed for Sleep., Disp: , Rfl:     levothyroxine (SYNTHROID) 200 mcg tablet, Take  by mouth Daily (before breakfast). , Disp: , Rfl:     pantoprazole (PROTONIX) 20 mg tablet, Take 20 mg by mouth daily. , Disp: , Rfl:     ALPRAZolam (XANAX) 0.25 mg tablet, Take 0.25 mg by mouth nightly as needed for Anxiety.  (Patient not taking: No sig reported), Disp: , Rfl:     ibuprofen (MOTRIN) 600 mg tablet, Take 1 Tab by mouth every eight (8) hours as needed for Pain. (Patient not taking: No sig reported), Disp: 20 Tab, Rfl: 0    albuterol (PROVENTIL HFA, VENTOLIN HFA, PROAIR HFA) 90 mcg/actuation inhaler, Take 2 Puffs by inhalation every four (4) hours as needed for Wheezing. (Patient not taking: No sig reported), Disp: , Rfl:     Allergies   Allergen Reactions    Pcn [Penicillins] Other (comments)     Hair fell out       Social History     Socioeconomic History    Marital status:      Spouse name: Not on file    Number of children: Not on file    Years of education: Not on file    Highest education level: Not on file   Occupational History    Not on file   Tobacco Use    Smoking status: Former    Smokeless tobacco: Never   Substance and Sexual Activity    Alcohol use: Yes     Comment: social    Drug use: No    Sexual activity: Yes     Partners: Male   Other Topics Concern    Not on file   Social History Narrative    Not on file     Social Determinants of Health     Financial Resource Strain: Not on file   Food Insecurity: Not on file   Transportation Needs: Not on file   Physical Activity: Not on file   Stress: Not on file   Social Connections: Not on file   Intimate Partner Violence: Not on file   Housing Stability: Not on file       No family history on file.     ROS   Constitutional: negative  Ears, Nose, Mouth, Throat, and Face: negative  Respiratory: negative  Cardiovascular: negative  Gastrointestinal: positive for abdominal pain  Genitourinary:negative  Integument/Breast: negative  Hematologic/Lymphatic: negative  Behavioral/Psychiatric: negative  Allergic/Immunologic: negative      Physical Exam:     Visit Vitals  /87 (BP 1 Location: Left arm, BP Patient Position: Sitting, BP Cuff Size: Adult)   Pulse 76   Temp 97.3 °F (36.3 °C) (Oral)   Resp 18   Ht 5' 4\" (1.626 m)   Wt 182 lb (82.6 kg)   SpO2 94%   BMI 31.24 kg/m²       General - alert and oriented, no apparent distress  HEENT - no jaundice, no hearing imparement  Pulm - CTAB, no C/W/R  CV - RRR, no M/R/G  Abd -soft, nondistended, bowel sounds present, upper midline hernia and bulge of the left lower quadrant  Ext - pulses intact in UE and LE bilaterally, no edema  Skin - supple, no rashes  Psychiatric - normal affect, good mood    Labs  Lab Results   Component Value Date/Time    Sodium 136 05/06/2020 01:31 PM    Potassium 3.7 05/06/2020 01:31 PM    Chloride 103 05/06/2020 01:31 PM    CO2 28 05/06/2020 01:31 PM    Anion gap 5 05/06/2020 01:31 PM    Glucose 120 (H) 05/06/2020 01:31 PM    BUN 12 05/06/2020 01:31 PM    Creatinine 0.64 05/06/2020 01:31 PM    BUN/Creatinine ratio 19 05/06/2020 01:31 PM    GFR est AA >60 05/06/2020 01:31 PM    GFR est non-AA >60 05/06/2020 01:31 PM    Calcium 8.4 (L) 05/06/2020 01:31 PM    Bilirubin, total 0.8 05/06/2020 01:31 PM    Alk. phosphatase 95 05/06/2020 01:31 PM    Protein, total 6.9 05/06/2020 01:31 PM    Albumin 3.9 05/06/2020 01:31 PM    Globulin 3.0 05/06/2020 01:31 PM    A-G Ratio 1.3 05/06/2020 01:31 PM    ALT (SGPT) 24 05/06/2020 01:31 PM    AST (SGOT) 17 05/06/2020 01:31 PM     Lab Results   Component Value Date/Time    WBC 15.6 (H) 05/06/2020 01:31 PM    HGB 14.2 05/06/2020 01:31 PM    HCT 42.5 05/06/2020 01:31 PM    PLATELET 143 77/34/7270 01:31 PM    MCV 95.9 05/06/2020 01:31 PM         Imaging  CT scan shows incisional hernia in the upper midline and some herniation adjacent to the area of the left lower quadrant colostomy takedown site both hernias about 2 to maybe 3 cm at most in size  I have reviewed and agree with all of the pertinent images    Assessment:     Adrián Finch is a 63 y.o. female with recurrent incisional hernia and left lower quadrant colostomy site hernia    Recommendations:     1. She appears to have a hernia of the upper midline near the xiphoid process and then another 1 at her colostomy site.   Looking back on the CT scan from a year ago and her abdominal exam currently I do not feel any bulge of the lower abdomen although there is a very mild diastases there. I do not think she requires hernia repair in the lower abdomen but will need repair in the upper abdomen and the colostomy site. I will post her for incisional hernia x2 in these different locations. These are separate locations of hernias which will need separate repair. I would likely plan on doing these robotically with preperitoneal repair at both sites. She is also lost weight from her previous surgeries. I have discussed the above procedure with the patient in detail. We reviewed the benefits and possible complications of the surgery which include bleeding, infection, damage to adjacent organs, venous thromboembolism, need for repeat surgery, death and other unforseen complications. The patient agreed to proceed with the surgery. Prudencio Winchester MD    Greater than half of the time: 30 minutes was used in counciling the patient about diagnosis and treatment plan    Ms. Cely Parry has a reminder for a \"due or due soon\" health maintenance. I have asked that she contact her primary care provider for follow-up on this health maintenance.

## 2022-09-13 NOTE — PROGRESS NOTES
1. Have you been to the ER, urgent care clinic since your last visit? Hospitalized since your last visit? No    2. Have you seen or consulted any other health care providers outside of the [address] [place] [address] since your last visit? Include any pap smears or colon screening.  No

## 2022-09-13 NOTE — LETTER
9/13/2022    Patient: Nico Skelton   YOB: 1960   Date of Visit: 9/13/2022     [facility] [address]  Via Fax: [phone]    Dear Shayy Hanley MD,      Thank you for referring Ms. Nico Skelton to [facility] for evaluation. My notes for this consultation are attached. If you have questions, please do not hesitate to call me. I look forward to following your patient along with you.       Sincerely,    Mando Christine MD

## 2022-10-03 ENCOUNTER — TELEPHONE (OUTPATIENT)
Dept: SURGERY | Age: 62
End: 2022-10-03

## 2022-10-03 NOTE — TELEPHONE ENCOUNTER
Returned call to Ms Alicia Fuentes verified all PHI. I informed patient she will be alright to have the procedure per Dr David Bush.

## 2022-10-03 NOTE — TELEPHONE ENCOUNTER
Patient is having surgery 11/10/2022, and is planning on having a dental procedure - a crown - on 11/08/2022. Patient would like to know if that will necessitate a surgery date change for her hernia surgery. Please call. Please advise  of same, thanks!

## 2022-10-06 ENCOUNTER — OFFICE VISIT (OUTPATIENT)
Dept: ORTHOPEDIC SURGERY | Age: 62
End: 2022-10-06
Payer: COMMERCIAL

## 2022-10-06 VITALS — WEIGHT: 180 LBS | BODY MASS INDEX: 30.73 KG/M2 | HEIGHT: 64 IN

## 2022-10-06 DIAGNOSIS — M25.511 ACUTE PAIN OF BOTH SHOULDERS: Primary | ICD-10-CM

## 2022-10-06 DIAGNOSIS — M25.512 ACUTE PAIN OF BOTH SHOULDERS: Primary | ICD-10-CM

## 2022-10-06 DIAGNOSIS — M19.011 PRIMARY OSTEOARTHRITIS, RIGHT SHOULDER: ICD-10-CM

## 2022-10-06 DIAGNOSIS — M19.012 PRIMARY OSTEOARTHRITIS, LEFT SHOULDER: ICD-10-CM

## 2022-10-06 PROCEDURE — 99214 OFFICE O/P EST MOD 30 MIN: CPT | Performed by: ORTHOPAEDIC SURGERY

## 2022-10-06 PROCEDURE — 20610 DRAIN/INJ JOINT/BURSA W/O US: CPT | Performed by: ORTHOPAEDIC SURGERY

## 2022-10-06 RX ORDER — BUPIVACAINE HYDROCHLORIDE 7.5 MG/ML
3 INJECTION, SOLUTION EPIDURAL; RETROBULBAR ONCE
Status: COMPLETED | OUTPATIENT
Start: 2022-10-06 | End: 2022-10-06

## 2022-10-06 RX ORDER — TRIAMCINOLONE ACETONIDE 40 MG/ML
40 INJECTION, SUSPENSION INTRA-ARTICULAR; INTRAMUSCULAR ONCE
Status: COMPLETED | OUTPATIENT
Start: 2022-10-06 | End: 2022-10-06

## 2022-10-06 RX ADMIN — TRIAMCINOLONE ACETONIDE 40 MG: 40 INJECTION, SUSPENSION INTRA-ARTICULAR; INTRAMUSCULAR at 08:53

## 2022-10-06 RX ADMIN — BUPIVACAINE HYDROCHLORIDE 22.5 MG: 7.5 INJECTION, SOLUTION EPIDURAL; RETROBULBAR at 08:53
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
## 2022-10-06 NOTE — LETTER
10/6/2022    Patient: Kerry Burton   YOB: 1960   Date of Visit: 10/6/2022     Lieutenant Prasad, [address] [facility]  [address] [place] [address]  Via Fax: [phone]    Dear Lieutenant Shanice MD,      Thank you for referring Ms. Kerry Burton to [facility] for evaluation. My notes for this consultation are attached. If you have questions, please do not hesitate to call me. I look forward to following your patient along with you.       Sincerely,    Dawn Erwin, DO

## 2022-11-02 ENCOUNTER — HOSPITAL ENCOUNTER (OUTPATIENT)
Dept: PREADMISSION TESTING | Age: 62
Discharge: HOME | End: 2022-11-02
Payer: COMMERCIAL

## 2022-11-02 VITALS
WEIGHT: 168 LBS | DIASTOLIC BLOOD PRESSURE: 86 MMHG | SYSTOLIC BLOOD PRESSURE: 136 MMHG | BODY MASS INDEX: 28.68 KG/M2 | HEART RATE: 57 BPM | TEMPERATURE: 97.9 F | HEIGHT: 64 IN

## 2022-11-02 LAB
ATRIAL RATE: 73 BPM
BASOPHILS # BLD: 0 K/UL (ref 0–0.1)
BASOPHILS NFR BLD: 1 % (ref 0–1)
CALCULATED P AXIS, ECG09: 50 DEGREES
CALCULATED R AXIS, ECG10: 37 DEGREES
CALCULATED T AXIS, ECG11: 31 DEGREES
DIAGNOSIS, 93000: NORMAL
DIFFERENTIAL METHOD BLD: NORMAL
EOSINOPHIL # BLD: 0.1 K/UL (ref 0–0.4)
EOSINOPHIL NFR BLD: 1 % (ref 0–7)
ERYTHROCYTE [DISTWIDTH] IN BLOOD BY AUTOMATED COUNT: 11.9 % (ref 11.5–14.5)
HCT VFR BLD AUTO: 39.9 % (ref 35–47)
HGB BLD-MCNC: 13.6 G/DL (ref 11.5–16)
IMM GRANULOCYTES # BLD AUTO: 0 K/UL (ref 0–0.04)
IMM GRANULOCYTES NFR BLD AUTO: 0 % (ref 0–0.5)
LYMPHOCYTES # BLD: 1 K/UL (ref 0.8–3.5)
LYMPHOCYTES NFR BLD: 17 % (ref 12–49)
MCH RBC QN AUTO: 33.3 PG (ref 26–34)
MCHC RBC AUTO-ENTMCNC: 34.1 G/DL (ref 30–36.5)
MCV RBC AUTO: 97.6 FL (ref 80–99)
MONOCYTES # BLD: 0.5 K/UL (ref 0–1)
MONOCYTES NFR BLD: 9 % (ref 5–13)
NEUTS SEG # BLD: 4.4 K/UL (ref 1.8–8)
NEUTS SEG NFR BLD: 72 % (ref 32–75)
NRBC # BLD: 0 K/UL (ref 0–0.01)
NRBC BLD-RTO: 0 PER 100 WBC
P-R INTERVAL, ECG05: 184 MS
PLATELET # BLD AUTO: 215 K/UL (ref 150–400)
PMV BLD AUTO: 9.6 FL (ref 8.9–12.9)
Q-T INTERVAL, ECG07: 368 MS
QRS DURATION, ECG06: 78 MS
QTC CALCULATION (BEZET), ECG08: 405 MS
RBC # BLD AUTO: 4.09 M/UL (ref 3.8–5.2)
VENTRICULAR RATE, ECG03: 73 BPM
WBC # BLD AUTO: 6 K/UL (ref 3.6–11)

## 2022-11-02 PROCEDURE — 93005 ELECTROCARDIOGRAM TRACING: CPT

## 2022-11-02 PROCEDURE — 85025 COMPLETE CBC W/AUTO DIFF WBC: CPT

## 2022-11-02 PROCEDURE — 36415 COLL VENOUS BLD VENIPUNCTURE: CPT

## 2022-11-02 RX ORDER — LEVOTHYROXINE SODIUM 112 UG/1
112 TABLET ORAL
COMMUNITY

## 2022-11-02 NOTE — PERIOP NOTES
PATIENT GIVEN SURGICAL SITE INFECTION FAQ HANDOUT AND HAND WASHING TIP SHEET. PREOP INSTRUCTIONS REVIEWED AND PATIENT VERBALIZES UNDERSTANDING OF INSTRUCTIONS. PATIENT HAS BEEN GIVEN THE OPPORTUNITY TO ASK ADDITIONAL QUESTIONS. PATIENT HAS  HAD COVID 19 VACCINE - PATIENT DOES NOT HAVE WITH HER TODAY. CALLED FOR PULMONARY NOTES , DR. CELI SCOTT, LAST OV 12/2021, OFFICE TO FAX LAST OFFICE NOTE.

## 2022-11-02 NOTE — PERIOP NOTES
[facility] INSTRUCTIONS    Surgery Date:   11/10/22    Your surgeon's office or [facility] staff will call you between 4 PM- 8 PM the day before surgery with your arrival time. If your surgery is on a Monday, you will receive a call the preceding Friday. Please report to [facility] Patient Access/Admitting on the 1st floor. Bring your insurance card, photo identification, and any copayment ( if applicable). If you are going home the same day of your surgery, you must have a responsible adult to drive you home. You need to have a responsible adult to stay with you the first 24 hours after surgery and you should not drive a car for 24 hours following your surgery. Do NOT eat any solid foods after midnight the night before surgery including candy, mint or gum. You may drink clear liquids from midnight until 1 hour prior to your arrival. You may drink up to 12 ounces at one time every 4 hours. Please note special instructions, if applicable, below for medications. Do NOT drink alcohol or smoke 24 hours before surgery. STOP smoking for 14 days prior as it helps with breathing and healing after surgery. If you are being admitted to the hospital, please leave personal belongings/luggage in your car until you have an assigned hospital room number. Please wear comfortable clothes. Wear your glasses instead of contacts. We ask that all money, jewelry and valuables be left at home. Wear no make up, particularly mascara, the day of surgery. All body piercings, rings, and jewelry need to be removed and left at home. Please remove any nail polish or artifical nails from your fingernails. Please wear your hair loose or down. Please no pony-tails, buns, or any metal hair accessories. If you shower the morning of surgery, please do not apply any lotions or powders afterwards. You may wear deodorant, unless having breast surgery. Do not shave any body area within 24 hours of your surgery.   Please follow all instructions to avoid any potential surgical cancellation. Should your physical condition change, (i.e. fever, cold, flu, etc.) please notify your surgeon as soon as possible. It is important to be on time. If a situation occurs where you may be delayed, please call:  [phone] / [phone] on the day of surgery. The Preadmission Testing staff can be reached at [phone]. Special instructions: ANY INSTRUCTIONS PER DR. En SONG'S OFFICE       Current Outpatient Medications   Medication Sig    levothyroxine (SYNTHROID) 112 mcg tablet Take 112 mcg by mouth Daily (before breakfast). ALPRAZolam (XANAX) 0.25 mg tablet Take 0.25 mg by mouth nightly as needed for Anxiety. ibuprofen (MOTRIN) 600 mg tablet Take 1 Tab by mouth every eight (8) hours as needed for Pain.    pantoprazole (PROTONIX) 20 mg tablet Take 20 mg by mouth as needed. albuterol (PROVENTIL HFA, VENTOLIN HFA, PROAIR HFA) 90 mcg/actuation inhaler Take 2 Puffs by inhalation every four (4) hours as needed for Wheezing. traZODone (DESYREL) 50 mg tablet Take 50 mg by mouth nightly as needed for Sleep. No current facility-administered medications for this encounter. YOU MUST ONLY TAKE THESE MEDICATIONS THE MORNING OF SURGERY WITH A SIP OF WATER: LEVOTHYROXINE, BRING ALBUTEROL INHALER WITH YOU DAY OF SURGERY AND TAKE AS PRESCRIBED DAY OF SURGERY. MEDICATIONS TO TAKE THE MORNING OF SURGERY ONLY IF NEEDED: TYLENOL  HOLD these prescription medications BEFORE Surgery: IBUPROPHEN  Ask your surgeon/prescribing physician about when/if to STOP taking these medications: ANY YOU HAVE QUESTIONS ON. Stop all vitamins, herbal medicines and Aspirin containing products 7 days prior to surgery. Stop any non-steroidal anti-inflammatory drugs (i.e. Ibuprofen, Naproxen, Advil, Aleve) 3 days before surgery. You may take Tylenol.     If you are currently taking Plavix, Coumadin,or any other blood-thinning/anticoagulant medication contact your prescribing physician for instructions. Eating and Drinking Before Surgery    You may eat a regular dinner at the usual time on the day before your surgery. Do NOT eat any solid foods after midnight unless your arrival time at the hospital is 3pm or later. You may drink clear liquids only from 12 midnight until 1 hours prior to your arrival time at the hospital on the day of your surgery. Do NOT drink alcohol. Clear liquids include:  Water  Fruit juices without pulp( i.e. apple juice)  Carbonated beverages  Black coffee (no cream/milk)  Tea (no cream/milk)  Gatorade  You may drink up to 12-16 ounces at one time every 4 hours between the hours of midnight and 1 hour before your arrival time at the hospital. Example- if your arrival time at the hospital is 6am, you may drink 12-16 ounces of clear liquids no later than 5am.  If your arrival time at the hospital is 3pm or later, you may eat a light breakfast before 8am.  A light breakfast includes: Toast or bagel (no butter)  Black coffee (no cream/milk)  Tea (no cream/milk)  Fruit juices without pulp ( i.e. apple juice)  Do NOT eat meat, eggs, vegetables or fruit  If you have any questions, please contact your surgeon's office. Preventing Infections Before and After - Your Surgery    IMPORTANT INSTRUCTIONS    You play an important role in your health and preparation for surgery. To reduce the germs on your skin you will need to shower with CHG soap (Chorhexidine gluconate 4%) two times before surgery. CHG soap (Hibiclens, Hex-A-Clens or store brand)  CHG soap will be provided at your Preadmission Testing (PAT) appointment. If you do not have a PAT appointment before surgery, you may arrange to  CHG soap from our office or purchase CHG soap at a pharmacy, grocery or department store. You need to purchase TWO 4 ounce bottles to use for your 2 showers.     Steps to follow:  Wash your hair with your normal shampoo and your body with regular soap and rinse well to remove shampoo and soap from your skin. Wet a clean washcloth and turn off the shower. Put CHG soap on washcloth and apply to your entire body from the neck down. Do not use on your head, face or private parts(genitals). Do not use CHG soap on open sores, wounds or areas of skin irritation. Wash you body gently for 5 minutes. Do not wash your skin too hard. This soap does not create lather. Pay special attention to your underarms and from your belly button to your feet. Turn the shower back on and rinse well to get CHG soap off your body. Pat your skin dry with a clean, dry towel. Do not apply lotions or moisturizer. Put on clean clothes and sleep on fresh bed sheets and do not allow pets to sleep with you. Shower with CHG soap 2 times before your surgery  The evening before your surgery  The morning of your surgery      Tips to help prevent infections after your surgery:  Protect your surgical wound from germs:  Hand washing is the most important thing you and your caregivers can do to prevent infections. Keep your bandage clean and dry! Do not touch your surgical wound. Use clean, freshly washed towels and washcloths every time you shower; do not share bath linens with others. Until your surgical wound is healed, wear clothing and sleep on bed linens each day that are clean and freshly washed. Do not allow pets to sleep in your bed with you or touch your surgical wound. Do not smoke - smoking delays wound healing. This may be a good time to stop smoking. If you have diabetes, it is important for you to manage your blood sugar levels properly before your surgery as well as after your surgery. Poorly managed blood sugar levels slow down wound healing and prevent you from healing completely. Patient Information Regarding COVID Restrictions      Day of Procedure    Please park in the parking deck or any designated visitor parking lot.   Enter the facility through the Main Entrance of the hospital.  On the day of surgery, please provide the cell phone number of the person who will be waiting for you to the Patient Access representative at the time of registration. Masks are highly recommended in the hospital, but not required. Once your procedure and the immediate recovery period is completed, a nurse in the recovery area will contact your designated visitor to inform them of your room number or to otherwise review other pertinent information regarding your care. Social distancing practices are strongly encouraged in waiting areas and the cafeteria. The patient was contacted  in person. She verbalized understanding of all instructions does not  need reinforcement.

## 2022-11-09 ENCOUNTER — ANESTHESIA EVENT (OUTPATIENT)
Dept: SURGERY | Age: 62
End: 2022-11-09
Payer: COMMERCIAL

## 2022-11-10 ENCOUNTER — ANESTHESIA (OUTPATIENT)
Dept: SURGERY | Age: 62
End: 2022-11-10
Payer: COMMERCIAL

## 2022-11-10 ENCOUNTER — HOSPITAL ENCOUNTER (OUTPATIENT)
Age: 62
Setting detail: OBSERVATION
Discharge: HOME | End: 2022-11-14
Attending: SURGERY | Admitting: SURGERY
Payer: COMMERCIAL

## 2022-11-10 DIAGNOSIS — K43.2 RECURRENT INCISIONAL HERNIA: Primary | ICD-10-CM

## 2022-11-10 PROCEDURE — 74011000250 HC RX REV CODE- 250: Performed by: SURGERY

## 2022-11-10 PROCEDURE — 74011250636 HC RX REV CODE- 250/636: Performed by: ANESTHESIOLOGY

## 2022-11-10 PROCEDURE — 77030036554: Performed by: SURGERY

## 2022-11-10 PROCEDURE — 74011250636 HC RX REV CODE- 250/636: Performed by: SURGERY

## 2022-11-10 PROCEDURE — 77030031139 HC SUT VCRL2 J&J -A: Performed by: SURGERY

## 2022-11-10 PROCEDURE — 74011250636 HC RX REV CODE- 250/636: Performed by: NURSE ANESTHETIST, CERTIFIED REGISTERED

## 2022-11-10 PROCEDURE — 77030010507 HC ADH SKN DERMBND J&J -B: Performed by: SURGERY

## 2022-11-10 PROCEDURE — 77030035464 HC SUT VLOC NON ABS COVD -B: Performed by: SURGERY

## 2022-11-10 PROCEDURE — 74011000250 HC RX REV CODE- 250: Performed by: NURSE ANESTHETIST, CERTIFIED REGISTERED

## 2022-11-10 PROCEDURE — 76210000016 HC OR PH I REC 1 TO 1.5 HR: Performed by: SURGERY

## 2022-11-10 PROCEDURE — 77030035278 HC STPLR SEAL ENDOWR INTU -B: Performed by: SURGERY

## 2022-11-10 PROCEDURE — 77030008684 HC TU ET CUF COVD -B: Performed by: STUDENT IN AN ORGANIZED HEALTH CARE EDUCATION/TRAINING PROGRAM

## 2022-11-10 PROCEDURE — S2900 ROBOTIC SURGICAL SYSTEM: HCPCS | Performed by: SURGERY

## 2022-11-10 PROCEDURE — 77030026438 HC STYL ET INTUB CARD -A: Performed by: STUDENT IN AN ORGANIZED HEALTH CARE EDUCATION/TRAINING PROGRAM

## 2022-11-10 PROCEDURE — 77030016151 HC PROTCTR LNS DFOG COVD -B: Performed by: SURGERY

## 2022-11-10 PROCEDURE — 49657 PR LAP, RECURRENT INCISIONAL HERNIA REPAIR,INCARCERATED: CPT | Performed by: SURGERY

## 2022-11-10 PROCEDURE — G0378 HOSPITAL OBSERVATION PER HR: HCPCS

## 2022-11-10 PROCEDURE — 77030035489 HC REDUCR CANN ENDOWR INTU -C: Performed by: SURGERY

## 2022-11-10 PROCEDURE — 74011250637 HC RX REV CODE- 250/637: Performed by: SURGERY

## 2022-11-10 PROCEDURE — 77030035277 HC OBTRTR BLDELSS DISP INTU -B: Performed by: SURGERY

## 2022-11-10 PROCEDURE — 77030002933 HC SUT MCRYL J&J -A: Performed by: SURGERY

## 2022-11-10 PROCEDURE — 74011250637 HC RX REV CODE- 250/637: Performed by: STUDENT IN AN ORGANIZED HEALTH CARE EDUCATION/TRAINING PROGRAM

## 2022-11-10 PROCEDURE — 76010000881 HC OR TIME 4.5 TO 5HR INTENSV - TIER 2: Performed by: SURGERY

## 2022-11-10 PROCEDURE — 77030022704 HC SUT VLOC COVD -B: Performed by: SURGERY

## 2022-11-10 PROCEDURE — 77030002895 HC DEV VASC CLOSR COVD -B: Performed by: SURGERY

## 2022-11-10 PROCEDURE — C1781 MESH (IMPLANTABLE): HCPCS | Performed by: SURGERY

## 2022-11-10 PROCEDURE — 74011250636 HC RX REV CODE- 250/636

## 2022-11-10 PROCEDURE — 77030038269 HC DRN EXT URIN PURWCK BARD -A

## 2022-11-10 PROCEDURE — 77030040361 HC SLV COMPR DVT MDII -B: Performed by: SURGERY

## 2022-11-10 PROCEDURE — 76060000040 HC ANESTHESIA 4.5 TO 5 HR: Performed by: SURGERY

## 2022-11-10 PROCEDURE — 2709999900 HC NON-CHARGEABLE SUPPLY: Performed by: SURGERY

## 2022-11-10 PROCEDURE — 74011250636 HC RX REV CODE- 250/636: Performed by: STUDENT IN AN ORGANIZED HEALTH CARE EDUCATION/TRAINING PROGRAM

## 2022-11-10 DEVICE — VENTRALIGHT ST MESH, 4.5" (11.4 CM), CIRCLE
Type: IMPLANTABLE DEVICE | Site: ABDOMEN | Status: FUNCTIONAL
Brand: VENTRALIGHT

## 2022-11-10 DEVICE — VENTRALIGHT ST MESH WITH ECHO PS POSITIONING SYSTEM, 4.5" (11.4 CM), CIRCLE
Type: IMPLANTABLE DEVICE | Site: ABDOMEN | Status: FUNCTIONAL
Brand: VENTRALIGHT

## 2022-11-10 RX ORDER — PANTOPRAZOLE SODIUM 20 MG/1
20 TABLET, DELAYED RELEASE ORAL
Status: DISCONTINUED | OUTPATIENT
Start: 2022-11-11 | End: 2022-11-14 | Stop reason: HOSPADM

## 2022-11-10 RX ORDER — KETAMINE HYDROCHLORIDE 10 MG/ML
INJECTION, SOLUTION INTRAMUSCULAR; INTRAVENOUS AS NEEDED
Status: DISCONTINUED | OUTPATIENT
Start: 2022-11-10 | End: 2022-11-10 | Stop reason: HOSPADM

## 2022-11-10 RX ORDER — LEVOTHYROXINE SODIUM 112 UG/1
112 TABLET ORAL
Status: DISCONTINUED | OUTPATIENT
Start: 2022-11-11 | End: 2022-11-14 | Stop reason: HOSPADM

## 2022-11-10 RX ORDER — BUPIVACAINE HYDROCHLORIDE AND EPINEPHRINE 5; 5 MG/ML; UG/ML
INJECTION, SOLUTION EPIDURAL; INTRACAUDAL; PERINEURAL AS NEEDED
Status: DISCONTINUED | OUTPATIENT
Start: 2022-11-10 | End: 2022-11-10 | Stop reason: HOSPADM

## 2022-11-10 RX ORDER — DEXAMETHASONE SODIUM PHOSPHATE 4 MG/ML
INJECTION, SOLUTION INTRA-ARTICULAR; INTRALESIONAL; INTRAMUSCULAR; INTRAVENOUS; SOFT TISSUE AS NEEDED
Status: DISCONTINUED | OUTPATIENT
Start: 2022-11-10 | End: 2022-11-10 | Stop reason: HOSPADM

## 2022-11-10 RX ORDER — FENTANYL CITRATE 50 UG/ML
INJECTION, SOLUTION INTRAMUSCULAR; INTRAVENOUS
Status: COMPLETED
Start: 2022-11-10 | End: 2022-11-10

## 2022-11-10 RX ORDER — ONDANSETRON 2 MG/ML
4 INJECTION INTRAMUSCULAR; INTRAVENOUS AS NEEDED
Status: DISCONTINUED | OUTPATIENT
Start: 2022-11-10 | End: 2022-11-10

## 2022-11-10 RX ORDER — ONDANSETRON 2 MG/ML
INJECTION INTRAMUSCULAR; INTRAVENOUS
Status: DISPENSED
Start: 2022-11-10 | End: 2022-11-11

## 2022-11-10 RX ORDER — ROCURONIUM BROMIDE 10 MG/ML
INJECTION, SOLUTION INTRAVENOUS AS NEEDED
Status: DISCONTINUED | OUTPATIENT
Start: 2022-11-10 | End: 2022-11-10 | Stop reason: HOSPADM

## 2022-11-10 RX ORDER — KETOROLAC TROMETHAMINE 30 MG/ML
15 INJECTION, SOLUTION INTRAMUSCULAR; INTRAVENOUS EVERY 6 HOURS
Status: DISCONTINUED | OUTPATIENT
Start: 2022-11-10 | End: 2022-11-14 | Stop reason: HOSPADM

## 2022-11-10 RX ORDER — OXYCODONE AND ACETAMINOPHEN 5; 325 MG/1; MG/1
1-2 TABLET ORAL
Status: DISCONTINUED | OUTPATIENT
Start: 2022-11-10 | End: 2022-11-14 | Stop reason: HOSPADM

## 2022-11-10 RX ORDER — ENOXAPARIN SODIUM 100 MG/ML
40 INJECTION SUBCUTANEOUS EVERY 24 HOURS
Status: DISCONTINUED | OUTPATIENT
Start: 2022-11-11 | End: 2022-11-14 | Stop reason: HOSPADM

## 2022-11-10 RX ORDER — SUCCINYLCHOLINE CHLORIDE 20 MG/ML
INJECTION INTRAMUSCULAR; INTRAVENOUS AS NEEDED
Status: DISCONTINUED | OUTPATIENT
Start: 2022-11-10 | End: 2022-11-10 | Stop reason: HOSPADM

## 2022-11-10 RX ORDER — ONDANSETRON 2 MG/ML
4 INJECTION INTRAMUSCULAR; INTRAVENOUS
Status: DISCONTINUED | OUTPATIENT
Start: 2022-11-10 | End: 2022-11-14 | Stop reason: HOSPADM

## 2022-11-10 RX ORDER — SODIUM CHLORIDE 0.9 % (FLUSH) 0.9 %
5-40 SYRINGE (ML) INJECTION EVERY 8 HOURS
Status: DISCONTINUED | OUTPATIENT
Start: 2022-11-10 | End: 2022-11-14 | Stop reason: HOSPADM

## 2022-11-10 RX ORDER — ROPIVACAINE HYDROCHLORIDE 5 MG/ML
30 INJECTION, SOLUTION EPIDURAL; INFILTRATION; PERINEURAL AS NEEDED
Status: DISCONTINUED | OUTPATIENT
Start: 2022-11-10 | End: 2022-11-10 | Stop reason: HOSPADM

## 2022-11-10 RX ORDER — ACETAMINOPHEN 325 MG/1
650 TABLET ORAL ONCE
Status: COMPLETED | OUTPATIENT
Start: 2022-11-10 | End: 2022-11-10

## 2022-11-10 RX ORDER — DIPHENHYDRAMINE HYDROCHLORIDE 50 MG/ML
12.5 INJECTION, SOLUTION INTRAMUSCULAR; INTRAVENOUS
Status: ACTIVE | OUTPATIENT
Start: 2022-11-10 | End: 2022-11-11

## 2022-11-10 RX ORDER — LIDOCAINE HYDROCHLORIDE 20 MG/ML
INJECTION, SOLUTION EPIDURAL; INFILTRATION; INTRACAUDAL; PERINEURAL AS NEEDED
Status: DISCONTINUED | OUTPATIENT
Start: 2022-11-10 | End: 2022-11-10 | Stop reason: HOSPADM

## 2022-11-10 RX ORDER — HYDROMORPHONE HYDROCHLORIDE 1 MG/ML
0.5 INJECTION, SOLUTION INTRAMUSCULAR; INTRAVENOUS; SUBCUTANEOUS ONCE
Status: COMPLETED | OUTPATIENT
Start: 2022-11-10 | End: 2022-11-10

## 2022-11-10 RX ORDER — ONDANSETRON 2 MG/ML
INJECTION INTRAMUSCULAR; INTRAVENOUS AS NEEDED
Status: DISCONTINUED | OUTPATIENT
Start: 2022-11-10 | End: 2022-11-10 | Stop reason: HOSPADM

## 2022-11-10 RX ORDER — SODIUM CHLORIDE 0.9 % (FLUSH) 0.9 %
5-40 SYRINGE (ML) INJECTION AS NEEDED
Status: DISCONTINUED | OUTPATIENT
Start: 2022-11-10 | End: 2022-11-10 | Stop reason: HOSPADM

## 2022-11-10 RX ORDER — GLYCOPYRROLATE 0.2 MG/ML
INJECTION INTRAMUSCULAR; INTRAVENOUS AS NEEDED
Status: DISCONTINUED | OUTPATIENT
Start: 2022-11-10 | End: 2022-11-10 | Stop reason: HOSPADM

## 2022-11-10 RX ORDER — FENTANYL CITRATE 50 UG/ML
25 INJECTION, SOLUTION INTRAMUSCULAR; INTRAVENOUS
Status: DISCONTINUED | OUTPATIENT
Start: 2022-11-10 | End: 2022-11-10

## 2022-11-10 RX ORDER — HYDROMORPHONE HYDROCHLORIDE 2 MG/ML
INJECTION, SOLUTION INTRAMUSCULAR; INTRAVENOUS; SUBCUTANEOUS AS NEEDED
Status: DISCONTINUED | OUTPATIENT
Start: 2022-11-10 | End: 2022-11-10 | Stop reason: HOSPADM

## 2022-11-10 RX ORDER — HYDROMORPHONE HYDROCHLORIDE 1 MG/ML
1 INJECTION, SOLUTION INTRAMUSCULAR; INTRAVENOUS; SUBCUTANEOUS
Status: DISCONTINUED | OUTPATIENT
Start: 2022-11-10 | End: 2022-11-14 | Stop reason: HOSPADM

## 2022-11-10 RX ORDER — NALOXONE HYDROCHLORIDE 0.4 MG/ML
0.4 INJECTION, SOLUTION INTRAMUSCULAR; INTRAVENOUS; SUBCUTANEOUS AS NEEDED
Status: DISCONTINUED | OUTPATIENT
Start: 2022-11-10 | End: 2022-11-14 | Stop reason: HOSPADM

## 2022-11-10 RX ORDER — FENTANYL CITRATE 50 UG/ML
25 INJECTION, SOLUTION INTRAMUSCULAR; INTRAVENOUS
Status: COMPLETED | OUTPATIENT
Start: 2022-11-10 | End: 2022-11-10

## 2022-11-10 RX ORDER — MIDAZOLAM HYDROCHLORIDE 1 MG/ML
INJECTION, SOLUTION INTRAMUSCULAR; INTRAVENOUS AS NEEDED
Status: DISCONTINUED | OUTPATIENT
Start: 2022-11-10 | End: 2022-11-10 | Stop reason: HOSPADM

## 2022-11-10 RX ORDER — ALBUTEROL SULFATE 0.83 MG/ML
2.5 SOLUTION RESPIRATORY (INHALATION)
Status: DISCONTINUED | OUTPATIENT
Start: 2022-11-10 | End: 2022-11-14 | Stop reason: HOSPADM

## 2022-11-10 RX ORDER — SODIUM CHLORIDE 0.9 % (FLUSH) 0.9 %
5-40 SYRINGE (ML) INJECTION AS NEEDED
Status: DISCONTINUED | OUTPATIENT
Start: 2022-11-10 | End: 2022-11-10

## 2022-11-10 RX ORDER — SODIUM CHLORIDE 0.9 % (FLUSH) 0.9 %
5-40 SYRINGE (ML) INJECTION EVERY 8 HOURS
Status: DISCONTINUED | OUTPATIENT
Start: 2022-11-10 | End: 2022-11-10 | Stop reason: HOSPADM

## 2022-11-10 RX ORDER — PROPOFOL 10 MG/ML
INJECTION, EMULSION INTRAVENOUS AS NEEDED
Status: DISCONTINUED | OUTPATIENT
Start: 2022-11-10 | End: 2022-11-10 | Stop reason: HOSPADM

## 2022-11-10 RX ORDER — SODIUM CHLORIDE 0.9 % (FLUSH) 0.9 %
5-40 SYRINGE (ML) INJECTION AS NEEDED
Status: DISCONTINUED | OUTPATIENT
Start: 2022-11-10 | End: 2022-11-14 | Stop reason: HOSPADM

## 2022-11-10 RX ORDER — SODIUM CHLORIDE, SODIUM LACTATE, POTASSIUM CHLORIDE, CALCIUM CHLORIDE 600; 310; 30; 20 MG/100ML; MG/100ML; MG/100ML; MG/100ML
1000 INJECTION, SOLUTION INTRAVENOUS CONTINUOUS
Status: DISCONTINUED | OUTPATIENT
Start: 2022-11-10 | End: 2022-11-10 | Stop reason: HOSPADM

## 2022-11-10 RX ORDER — HYDROMORPHONE HYDROCHLORIDE 1 MG/ML
0.2 INJECTION, SOLUTION INTRAMUSCULAR; INTRAVENOUS; SUBCUTANEOUS
Status: DISCONTINUED | OUTPATIENT
Start: 2022-11-10 | End: 2022-11-10 | Stop reason: HOSPADM

## 2022-11-10 RX ORDER — OXYCODONE HYDROCHLORIDE 5 MG/1
5 TABLET ORAL AS NEEDED
Status: DISCONTINUED | OUTPATIENT
Start: 2022-11-10 | End: 2022-11-10

## 2022-11-10 RX ORDER — HYDRALAZINE HYDROCHLORIDE 20 MG/ML
20 INJECTION INTRAMUSCULAR; INTRAVENOUS
Status: DISCONTINUED | OUTPATIENT
Start: 2022-11-10 | End: 2022-11-14 | Stop reason: HOSPADM

## 2022-11-10 RX ORDER — DEXMEDETOMIDINE HYDROCHLORIDE 100 UG/ML
INJECTION, SOLUTION INTRAVENOUS AS NEEDED
Status: DISCONTINUED | OUTPATIENT
Start: 2022-11-10 | End: 2022-11-10 | Stop reason: HOSPADM

## 2022-11-10 RX ORDER — PROCHLORPERAZINE EDISYLATE 5 MG/ML
10 INJECTION INTRAMUSCULAR; INTRAVENOUS
Status: DISCONTINUED | OUTPATIENT
Start: 2022-11-10 | End: 2022-11-14 | Stop reason: HOSPADM

## 2022-11-10 RX ORDER — SODIUM CHLORIDE 0.9 % (FLUSH) 0.9 %
5-40 SYRINGE (ML) INJECTION EVERY 8 HOURS
Status: DISCONTINUED | OUTPATIENT
Start: 2022-11-10 | End: 2022-11-10

## 2022-11-10 RX ORDER — ONDANSETRON 2 MG/ML
4 INJECTION INTRAMUSCULAR; INTRAVENOUS ONCE
Status: COMPLETED | OUTPATIENT
Start: 2022-11-10 | End: 2022-11-10

## 2022-11-10 RX ORDER — FENTANYL CITRATE 50 UG/ML
INJECTION, SOLUTION INTRAMUSCULAR; INTRAVENOUS AS NEEDED
Status: DISCONTINUED | OUTPATIENT
Start: 2022-11-10 | End: 2022-11-10 | Stop reason: HOSPADM

## 2022-11-10 RX ORDER — NEOSTIGMINE METHYLSULFATE 1 MG/ML
INJECTION, SOLUTION INTRAVENOUS AS NEEDED
Status: DISCONTINUED | OUTPATIENT
Start: 2022-11-10 | End: 2022-11-10 | Stop reason: HOSPADM

## 2022-11-10 RX ORDER — FENTANYL CITRATE 50 UG/ML
50 INJECTION, SOLUTION INTRAMUSCULAR; INTRAVENOUS AS NEEDED
Status: DISCONTINUED | OUTPATIENT
Start: 2022-11-10 | End: 2022-11-10 | Stop reason: HOSPADM

## 2022-11-10 RX ORDER — HYDROMORPHONE HYDROCHLORIDE 1 MG/ML
0.2 INJECTION, SOLUTION INTRAMUSCULAR; INTRAVENOUS; SUBCUTANEOUS
Status: DISCONTINUED | OUTPATIENT
Start: 2022-11-10 | End: 2022-11-10

## 2022-11-10 RX ORDER — LIDOCAINE HYDROCHLORIDE 10 MG/ML
0.1 INJECTION, SOLUTION EPIDURAL; INFILTRATION; INTRACAUDAL; PERINEURAL AS NEEDED
Status: DISCONTINUED | OUTPATIENT
Start: 2022-11-10 | End: 2022-11-10 | Stop reason: HOSPADM

## 2022-11-10 RX ORDER — SODIUM CHLORIDE, SODIUM LACTATE, POTASSIUM CHLORIDE, CALCIUM CHLORIDE 600; 310; 30; 20 MG/100ML; MG/100ML; MG/100ML; MG/100ML
75 INJECTION, SOLUTION INTRAVENOUS CONTINUOUS
Status: DISCONTINUED | OUTPATIENT
Start: 2022-11-10 | End: 2022-11-11

## 2022-11-10 RX ORDER — ALPRAZOLAM 0.25 MG/1
0.25 TABLET ORAL
Status: DISCONTINUED | OUTPATIENT
Start: 2022-11-10 | End: 2022-11-14 | Stop reason: HOSPADM

## 2022-11-10 RX ORDER — TRAZODONE HYDROCHLORIDE 50 MG/1
50 TABLET ORAL
Status: DISCONTINUED | OUTPATIENT
Start: 2022-11-10 | End: 2022-11-14 | Stop reason: HOSPADM

## 2022-11-10 RX ORDER — MIDAZOLAM HYDROCHLORIDE 1 MG/ML
1 INJECTION, SOLUTION INTRAMUSCULAR; INTRAVENOUS AS NEEDED
Status: DISCONTINUED | OUTPATIENT
Start: 2022-11-10 | End: 2022-11-10 | Stop reason: HOSPADM

## 2022-11-10 RX ORDER — SODIUM CHLORIDE, SODIUM LACTATE, POTASSIUM CHLORIDE, CALCIUM CHLORIDE 600; 310; 30; 20 MG/100ML; MG/100ML; MG/100ML; MG/100ML
INJECTION, SOLUTION INTRAVENOUS
Status: DISCONTINUED | OUTPATIENT
Start: 2022-11-10 | End: 2022-11-10 | Stop reason: HOSPADM

## 2022-11-10 RX ADMIN — ROCURONIUM BROMIDE 20 MG: 10 SOLUTION INTRAVENOUS at 11:16

## 2022-11-10 RX ADMIN — HYDROMORPHONE HYDROCHLORIDE 0.4 MG: 1 INJECTION, SOLUTION INTRAMUSCULAR; INTRAVENOUS; SUBCUTANEOUS at 14:59

## 2022-11-10 RX ADMIN — HYDROMORPHONE HYDROCHLORIDE 1 MG: 1 INJECTION, SOLUTION INTRAMUSCULAR; INTRAVENOUS; SUBCUTANEOUS at 22:17

## 2022-11-10 RX ADMIN — HYDROMORPHONE HYDROCHLORIDE 0.4 MG: 1 INJECTION, SOLUTION INTRAMUSCULAR; INTRAVENOUS; SUBCUTANEOUS at 15:32

## 2022-11-10 RX ADMIN — ROCURONIUM BROMIDE 10 MG: 10 SOLUTION INTRAVENOUS at 10:43

## 2022-11-10 RX ADMIN — KETOROLAC TROMETHAMINE 15 MG: 30 INJECTION, SOLUTION INTRAMUSCULAR; INTRAVENOUS at 17:42

## 2022-11-10 RX ADMIN — KETOROLAC TROMETHAMINE 15 MG: 30 INJECTION, SOLUTION INTRAMUSCULAR; INTRAVENOUS at 23:52

## 2022-11-10 RX ADMIN — MIDAZOLAM 2 MG: 1 INJECTION INTRAMUSCULAR; INTRAVENOUS at 09:54

## 2022-11-10 RX ADMIN — ROCURONIUM BROMIDE 25 MG: 10 SOLUTION INTRAVENOUS at 10:12

## 2022-11-10 RX ADMIN — ONDANSETRON HYDROCHLORIDE 4 MG: 2 INJECTION, SOLUTION INTRAMUSCULAR; INTRAVENOUS at 13:56

## 2022-11-10 RX ADMIN — PROPOFOL 100 MG: 10 INJECTION, EMULSION INTRAVENOUS at 10:03

## 2022-11-10 RX ADMIN — PROPOFOL 50 MG: 10 INJECTION, EMULSION INTRAVENOUS at 10:06

## 2022-11-10 RX ADMIN — HYDROMORPHONE HYDROCHLORIDE 0.5 MG: 2 INJECTION, SOLUTION INTRAMUSCULAR; INTRAVENOUS; SUBCUTANEOUS at 14:04

## 2022-11-10 RX ADMIN — SODIUM CHLORIDE, POTASSIUM CHLORIDE, SODIUM LACTATE AND CALCIUM CHLORIDE 75 ML/HR: 600; 310; 30; 20 INJECTION, SOLUTION INTRAVENOUS at 21:01

## 2022-11-10 RX ADMIN — ROCURONIUM BROMIDE 5 MG: 10 SOLUTION INTRAVENOUS at 10:03

## 2022-11-10 RX ADMIN — GLYCOPYRROLATE 0.8 MG: 0.2 INJECTION INTRAMUSCULAR; INTRAVENOUS at 14:02

## 2022-11-10 RX ADMIN — FENTANYL CITRATE 25 MCG: 50 INJECTION, SOLUTION INTRAMUSCULAR; INTRAVENOUS at 14:57

## 2022-11-10 RX ADMIN — PROPOFOL 50 MG: 10 INJECTION, EMULSION INTRAVENOUS at 10:09

## 2022-11-10 RX ADMIN — Medication 25 MG: at 10:27

## 2022-11-10 RX ADMIN — FENTANYL CITRATE 50 MCG: 50 INJECTION INTRAMUSCULAR; INTRAVENOUS at 10:03

## 2022-11-10 RX ADMIN — FENTANYL CITRATE 25 MCG: 50 INJECTION, SOLUTION INTRAMUSCULAR; INTRAVENOUS at 14:48

## 2022-11-10 RX ADMIN — PHENYLEPHRINE HYDROCHLORIDE 40 MCG/MIN: 10 INJECTION INTRAVENOUS at 10:49

## 2022-11-10 RX ADMIN — ROCURONIUM BROMIDE 10 MG: 10 SOLUTION INTRAVENOUS at 13:22

## 2022-11-10 RX ADMIN — Medication 25 MG: at 10:03

## 2022-11-10 RX ADMIN — LIDOCAINE HYDROCHLORIDE 80 MG: 20 INJECTION, SOLUTION EPIDURAL; INFILTRATION; INTRACAUDAL; PERINEURAL at 10:03

## 2022-11-10 RX ADMIN — ROCURONIUM BROMIDE 20 MG: 10 SOLUTION INTRAVENOUS at 12:25

## 2022-11-10 RX ADMIN — HYDROMORPHONE HYDROCHLORIDE 1 MG: 1 INJECTION, SOLUTION INTRAMUSCULAR; INTRAVENOUS; SUBCUTANEOUS at 17:42

## 2022-11-10 RX ADMIN — ROCURONIUM BROMIDE 10 MG: 10 SOLUTION INTRAVENOUS at 12:52

## 2022-11-10 RX ADMIN — ONDANSETRON 4 MG: 2 INJECTION INTRAMUSCULAR; INTRAVENOUS at 19:32

## 2022-11-10 RX ADMIN — HYDROMORPHONE HYDROCHLORIDE 0.5 MG: 2 INJECTION, SOLUTION INTRAMUSCULAR; INTRAVENOUS; SUBCUTANEOUS at 14:32

## 2022-11-10 RX ADMIN — SODIUM CHLORIDE, POTASSIUM CHLORIDE, SODIUM LACTATE AND CALCIUM CHLORIDE: 600; 310; 30; 20 INJECTION, SOLUTION INTRAVENOUS at 10:15

## 2022-11-10 RX ADMIN — HYDROMORPHONE HYDROCHLORIDE 0.5 MG: 2 INJECTION, SOLUTION INTRAMUSCULAR; INTRAVENOUS; SUBCUTANEOUS at 12:54

## 2022-11-10 RX ADMIN — ROCURONIUM BROMIDE 20 MG: 10 SOLUTION INTRAVENOUS at 11:53

## 2022-11-10 RX ADMIN — NEOSTIGMINE METHYLSULFATE 5 MG: 1 INJECTION, SOLUTION INTRAVENOUS at 14:02

## 2022-11-10 RX ADMIN — FENTANYL CITRATE 50 MCG: 50 INJECTION INTRAMUSCULAR; INTRAVENOUS at 10:27

## 2022-11-10 RX ADMIN — DEXMEDETOMIDINE HYDROCHLORIDE 10 MCG: 100 INJECTION, SOLUTION, CONCENTRATE INTRAVENOUS at 13:12

## 2022-11-10 RX ADMIN — ONDANSETRON HYDROCHLORIDE 4 MG: 2 SOLUTION INTRAMUSCULAR; INTRAVENOUS at 16:17

## 2022-11-10 RX ADMIN — SODIUM CHLORIDE, POTASSIUM CHLORIDE, SODIUM LACTATE AND CALCIUM CHLORIDE 1000 ML: 600; 310; 30; 20 INJECTION, SOLUTION INTRAVENOUS at 09:13

## 2022-11-10 RX ADMIN — HYDROMORPHONE HYDROCHLORIDE 0.2 MG: 1 INJECTION, SOLUTION INTRAMUSCULAR; INTRAVENOUS; SUBCUTANEOUS at 15:16

## 2022-11-10 RX ADMIN — HYDROMORPHONE HYDROCHLORIDE 0.5 MG: 1 INJECTION, SOLUTION INTRAMUSCULAR; INTRAVENOUS; SUBCUTANEOUS at 16:18

## 2022-11-10 RX ADMIN — WATER 2 G: 1 INJECTION INTRAMUSCULAR; INTRAVENOUS; SUBCUTANEOUS at 10:15

## 2022-11-10 RX ADMIN — OXYCODONE AND ACETAMINOPHEN 2 TABLET: 5; 325 TABLET ORAL at 21:07

## 2022-11-10 RX ADMIN — FENTANYL CITRATE 25 MCG: 50 INJECTION, SOLUTION INTRAMUSCULAR; INTRAVENOUS at 15:10

## 2022-11-10 RX ADMIN — ACETAMINOPHEN 650 MG: 325 TABLET ORAL at 09:02

## 2022-11-10 RX ADMIN — DEXMEDETOMIDINE HYDROCHLORIDE 10 MCG: 100 INJECTION, SOLUTION, CONCENTRATE INTRAVENOUS at 09:54

## 2022-11-10 RX ADMIN — FENTANYL CITRATE 25 MCG: 50 INJECTION, SOLUTION INTRAMUSCULAR; INTRAVENOUS at 15:26

## 2022-11-10 RX ADMIN — DEXAMETHASONE SODIUM PHOSPHATE 4 MG: 4 INJECTION, SOLUTION INTRAMUSCULAR; INTRAVENOUS at 10:21

## 2022-11-10 RX ADMIN — PROPOFOL 50 MCG/KG/MIN: 10 INJECTION, EMULSION INTRAVENOUS at 10:12

## 2022-11-10 RX ADMIN — SUCCINYLCHOLINE CHLORIDE 100 MG: 20 INJECTION, SOLUTION INTRAMUSCULAR; INTRAVENOUS at 10:03

## 2022-11-10 RX ADMIN — PROCHLORPERAZINE EDISYLATE 10 MG: 5 INJECTION INTRAMUSCULAR; INTRAVENOUS at 20:34

## 2022-11-10 NOTE — ANESTHESIA PREPROCEDURE EVALUATION
Relevant Problems   No relevant active problems       Anesthetic History     PONV          Review of Systems / Medical History      Pulmonary    COPD: mild    Sleep apnea           Neuro/Psych              Cardiovascular                       GI/Hepatic/Renal     GERD           Endo/Other      Hypothyroidism  Arthritis     Other Findings            Physical Exam    Airway  Mallampati: II  TM Distance: 4 - 6 cm  Neck ROM: normal range of motion   Mouth opening: Normal     Cardiovascular    Rhythm: regular  Rate: normal         Dental  No notable dental hx       Pulmonary  Breath sounds clear to auscultation               Abdominal  GI exam deferred       Other Findings            Anesthetic Plan    ASA: 2  Anesthesia type: general          Induction: Intravenous  Anesthetic plan and risks discussed with: Patient

## 2022-11-10 NOTE — H&P
[address] [facility] at [facility] Surgery History and Physical     History of Present Illness:      Jaime Carr is a 63 y.o. female who appears to have multiple incisional hernias. She has a history of exploratory laparotomy sigmoid resection for perforated diverticulitis with colostomy then had a colostomy takedown. She also had a laparoscopic exploration for abdominal pain afterwards. She subsequently had a incisional hernia repair done at [facility] by Dr. Estela Villegas. She describes that he did use mesh in the repair. She appears to have a hernia in the upper midline lower midline and at the colostomy site. She says she has a lot of abdominal pain related to the hernias. She has pain with straining lifting and sometimes randomly. The pain is a 5-6 out of 10 when she has it. She has now lost 20+ pounds since I saw her a number of months ago. I have gotten the reports from [address] [place] [address]. Looking back at her previous hernia repair she had a robotic IPOM style repair with a 15 cm intraperitoneal mesh by Dr. Tereza Ca at [facility]          Past Medical History:   Diagnosis Date    Asthma      Chronic pain      GERD (gastroesophageal reflux disease)      Graves disease      Other ill-defined conditions(819.16)       thyroid dysfunction    Stool color black                 Past Surgical History:   Procedure Laterality Date    HX HERNIA REPAIR   2020    HX ORTHOPAEDIC   foot injury, left             Current Outpatient Medications:     traZODone (DESYREL) 50 mg tablet, Take 50 mg by mouth nightly as needed for Sleep., Disp: , Rfl:     levothyroxine (SYNTHROID) 200 mcg tablet, Take  by mouth Daily (before breakfast). , Disp: , Rfl:     pantoprazole (PROTONIX) 20 mg tablet, Take 20 mg by mouth daily. , Disp: , Rfl:     ALPRAZolam (XANAX) 0.25 mg tablet, Take 0.25 mg by mouth nightly as needed for Anxiety.  (Patient not taking: No sig reported), Disp: , Rfl:     ibuprofen (MOTRIN) 600 mg tablet, Take 1 Tab by mouth every eight (8) hours as needed for Pain. (Patient not taking: No sig reported), Disp: 20 Tab, Rfl: 0    albuterol (PROVENTIL HFA, VENTOLIN HFA, PROAIR HFA) 90 mcg/actuation inhaler, Take 2 Puffs by inhalation every four (4) hours as needed for Wheezing. (Patient not taking: No sig reported), Disp: , Rfl:            Allergies   Allergen Reactions    Pcn [Penicillins] Other (comments)       Hair fell out         Social History            Socioeconomic History    Marital status:        Spouse name: Not on file    Number of children: Not on file    Years of education: Not on file    Highest education level: Not on file   Occupational History    Not on file   Tobacco Use    Smoking status: Former    Smokeless tobacco: Never   Substance and Sexual Activity    Alcohol use: Yes       Comment: social    Drug use: No    Sexual activity: Yes       Partners: Male   Other Topics Concern    Not on file   Social History Narrative    Not on file      Social Determinants of Health      Financial Resource Strain: Not on file   Food Insecurity: Not on file   Transportation Needs: Not on file   Physical Activity: Not on file   Stress: Not on file   Social Connections: Not on file   Intimate Partner Violence: Not on file   Housing Stability: Not on file         No family history on file.      ROS   Constitutional: negative  Ears, Nose, Mouth, Throat, and Face: negative  Respiratory: negative  Cardiovascular: negative  Gastrointestinal: positive for abdominal pain  Genitourinary:negative  Integument/Breast: negative  Hematologic/Lymphatic: negative  Behavioral/Psychiatric: negative  Allergic/Immunologic: negative        Physical Exam:      Visit Vitals  /87 (BP 1 Location: Left arm, BP Patient Position: Sitting, BP Cuff Size: Adult)   Pulse 76   Temp 97.3 °F (36.3 °C) (Oral)   Resp 18   Ht 5' 4\" (1.626 m)   Wt 182 lb (82.6 kg)   SpO2 94%   BMI 31.24 kg/m²         General - alert and oriented, no apparent distress  HEENT - no jaundice, no hearing imparement  Pulm - CTAB, no C/W/R  CV - RRR, no M/R/G  Abd -soft, nondistended, bowel sounds present, upper midline hernia and bulge of the left lower quadrant  Ext - pulses intact in UE and LE bilaterally, no edema  Skin - supple, no rashes  Psychiatric - normal affect, good mood     Labs        Lab Results   Component Value Date/Time     Sodium 136 05/06/2020 01:31 PM     Potassium 3.7 05/06/2020 01:31 PM     Chloride 103 05/06/2020 01:31 PM     CO2 28 05/06/2020 01:31 PM     Anion gap 5 05/06/2020 01:31 PM     Glucose 120 (H) 05/06/2020 01:31 PM     BUN 12 05/06/2020 01:31 PM     Creatinine 0.64 05/06/2020 01:31 PM     BUN/Creatinine ratio 19 05/06/2020 01:31 PM     GFR est AA >60 05/06/2020 01:31 PM     GFR est non-AA >60 05/06/2020 01:31 PM     Calcium 8.4 (L) 05/06/2020 01:31 PM     Bilirubin, total 0.8 05/06/2020 01:31 PM     Alk. phosphatase 95 05/06/2020 01:31 PM     Protein, total 6.9 05/06/2020 01:31 PM     Albumin 3.9 05/06/2020 01:31 PM     Globulin 3.0 05/06/2020 01:31 PM     A-G Ratio 1.3 05/06/2020 01:31 PM     ALT (SGPT) 24 05/06/2020 01:31 PM     AST (SGOT) 17 05/06/2020 01:31 PM            Lab Results   Component Value Date/Time     WBC 15.6 (H) 05/06/2020 01:31 PM     HGB 14.2 05/06/2020 01:31 PM     HCT 42.5 05/06/2020 01:31 PM     PLATELET 970 46/26/1723 01:31 PM     MCV 95.9 05/06/2020 01:31 PM            Imaging  CT scan shows incisional hernia in the upper midline and some herniation adjacent to the area of the left lower quadrant colostomy takedown site both hernias about 2 to maybe 3 cm at most in size  I have reviewed and agree with all of the pertinent images     Assessment:      Nu Meneses is a 63 y.o. female with recurrent incisional hernia and left lower quadrant colostomy site hernia     Recommendations:      1.    She appears to have a hernia of the upper midline near the xiphoid process and then another 1 at her colostomy site. Looking back on the CT scan from a year ago and her abdominal exam currently I do not feel any bulge of the lower abdomen although there is a very mild diastases there. I do not think she requires hernia repair in the lower abdomen but will need repair in the upper abdomen and the colostomy site. I will post her for incisional hernia x2 in these different locations. These are separate locations of hernias which will need separate repair. I would likely plan on doing these robotically with preperitoneal repair at both sites. She is also lost weight from her previous surgeries. I have discussed the above procedure with the patient in detail. We reviewed the benefits and possible complications of the surgery which include bleeding, infection, damage to adjacent organs, venous thromboembolism, need for repeat surgery, death and other unforseen complications. The patient agreed to proceed with the surgery.           Jose Luevano MD

## 2022-11-10 NOTE — PERIOP NOTES
Patient tearful and anxious after signing consents, she states she has had a lot of surgery and is just anxious, offered tissues, emotional support/ prayer and pastoral care , she declined pastoral care, after talking and warming blanket she is now calm and relaxing with blanket on.

## 2022-11-10 NOTE — PROGRESS NOTES
11/10/22 1318   Family Communication   Family Update Message Surgeon working   Delivery Origin Surgeon    Relationship to Patient Spouse    Phone Number [facility]   Family/Significant Other Update Called

## 2022-11-10 NOTE — PROGRESS NOTES
11/10/22 1049   Family Communication   Family Update Message Procedure started   Delivery Origin Nurse    Relationship to Patient Son    Phone Number Demi Numbers [phone]   Family/Significant Other Update Called

## 2022-11-10 NOTE — BRIEF OP NOTE
Brief Postoperative Note    Patient: Rafael Pantoja  YOB: 1960  MRN: [id number]    Date of Procedure: 11/10/2022     Pre-Op Diagnosis: RECURRENT INCISIONAL HERNIA x 2    Post-Op Diagnosis:  RECURRENT INCISIONAL HERNIA x 3       Procedure(s):  ROBTIC RECURRENT INCARECERATED INCISIONAL HERNIA INCISIONAL HERNIA REPAIR WITH MESH x 3    Surgeon(s):  Sam Drake MD    Surgical Assistant: Surg Asst-1: Cristian Gates    Anesthesia: General     Estimated Blood Loss (mL): Minimal    Complications: None    Specimens: * No specimens in log *     Implants:   Implant Name Type Inv. Item Serial No.  Lot No. LRB No. Used Action   MESH NAVNEET [place] 4. Michael Paredes S/T - [id number] Mesh MESH NAVNEET [place] 4. 5IN -- [place] Noon S/T  BARD DAVOL_WD [id number] N/A 1 Implanted   MESH W/ECHO PS 11.4CM CIR -- VENTRALIGHT ORDER BY CA - [id number] Mesh MESH W/ECHO PS 11.4CM CIR -- VENTRALIGHT ORDER BY CA  BARD DAVOL_WD [id number] N/A 1 Implanted   MESH NAVNEET [place] 4. Michael Paredes S/T - [id number] Mesh MESH NAVNEET [place] 4. 5IN -- VENTRALIGHT S/T  BARD DAVOL_WD [id number] N/A 1 Implanted       Drains: * No LDAs found *    Findings: Multiple recurrent incisional hernias. Epigastric hernia 2cm repaired primarily and with 9cm Bard Ventralight ST mesh, LLQ colostomy site hernia 3cm repaired primarily and with 11cm Bard Ventralight St mesh ECHO, lower abdominal hernia 2cm repaired with 9cm Bard Ventralight St mesh.   All hernias were repaired IPOM intraperitoneal mesh onlay repair, omentum draping over the abdominal wall covering intestines from mesh    All hernias were separately repaired with 3 different meshes, 3 separate defects, each were spaced 15+cm apart from each other    Electronically Signed by Estee Ansari MD on 11/10/2022 at 2:46 PM

## 2022-11-10 NOTE — PERIOP NOTES
Pt sleeping. No longer moaning with pain    TRANSFER - OUT REPORT:    Verbal report given to Kay(name) on Mendel Hollow  being transferred to [facility](unit) for routine post - op       Report consisted of patients Situation, Background, Assessment and   Recommendations(SBAR). Time Pre op antibiotic given:1015  Anesthesia Stop time: 9111    Information from the following report(s) SBAR, OR Summary, Intake/Output, MAR, and Accordion was reviewed with the receiving nurse. Opportunity for questions and clarification was provided. Is the patient on 02? YES       L/Min 2       Other     Is the patient on a monitor? NO    Is the nurse transporting with the patient? NO    Surgical Waiting Area notified of patient's transfer from PACU? YES      The following personal items collected during your admission accompanied patient upon transfer:   Dental Appliance: Dental Appliances: None  Vision: Visual Aid: Glasses  Hearing Aid:    Jewelry: Jewelry: Bracelet  Clothing: Clothing: Socks, Footwear, Pants, Shirt  Other Valuables:  Other Valuables: Cell Phone  Valuables sent to safe:      Glasses and pink bag to floor with pt

## 2022-11-10 NOTE — PERIOP NOTES
Pt again moaning with pain  States its 5-6/10  Dr Simeon Severance notified with orders  Rec'd.    Pt medicated for pain and for nausea

## 2022-11-11 PROCEDURE — 74011250637 HC RX REV CODE- 250/637: Performed by: SURGERY

## 2022-11-11 PROCEDURE — 97530 THERAPEUTIC ACTIVITIES: CPT

## 2022-11-11 PROCEDURE — 96376 TX/PRO/DX INJ SAME DRUG ADON: CPT

## 2022-11-11 PROCEDURE — 51798 US URINE CAPACITY MEASURE: CPT

## 2022-11-11 PROCEDURE — 74011250636 HC RX REV CODE- 250/636: Performed by: SURGERY

## 2022-11-11 PROCEDURE — 74011000250 HC RX REV CODE- 250: Performed by: SURGERY

## 2022-11-11 PROCEDURE — 51702 INSERT TEMP BLADDER CATH: CPT

## 2022-11-11 PROCEDURE — 96374 THER/PROPH/DIAG INJ IV PUSH: CPT

## 2022-11-11 PROCEDURE — 96375 TX/PRO/DX INJ NEW DRUG ADDON: CPT

## 2022-11-11 PROCEDURE — G0378 HOSPITAL OBSERVATION PER HR: HCPCS

## 2022-11-11 PROCEDURE — 97161 PT EVAL LOW COMPLEX 20 MIN: CPT

## 2022-11-11 PROCEDURE — 96372 THER/PROPH/DIAG INJ SC/IM: CPT

## 2022-11-11 PROCEDURE — 97116 GAIT TRAINING THERAPY: CPT

## 2022-11-11 RX ORDER — OXYCODONE AND ACETAMINOPHEN 5; 325 MG/1; MG/1
1 TABLET ORAL
Qty: 30 TABLET | Refills: 0 | Status: SHIPPED | OUTPATIENT
Start: 2022-11-11 | End: 2022-11-18

## 2022-11-11 RX ORDER — IBUPROFEN 800 MG/1
800 TABLET ORAL
Qty: 30 TABLET | Refills: 0 | Status: SHIPPED | OUTPATIENT
Start: 2022-11-11 | End: 2022-11-29 | Stop reason: SDUPTHER

## 2022-11-11 RX ADMIN — ENOXAPARIN SODIUM 40 MG: 100 INJECTION SUBCUTANEOUS at 08:52

## 2022-11-11 RX ADMIN — OXYCODONE AND ACETAMINOPHEN 1 TABLET: 5; 325 TABLET ORAL at 22:08

## 2022-11-11 RX ADMIN — LEVOTHYROXINE SODIUM 112 MCG: 0.11 TABLET ORAL at 06:57

## 2022-11-11 RX ADMIN — KETOROLAC TROMETHAMINE 15 MG: 30 INJECTION, SOLUTION INTRAMUSCULAR; INTRAVENOUS at 11:19

## 2022-11-11 RX ADMIN — SODIUM CHLORIDE, PRESERVATIVE FREE 10 ML: 5 INJECTION INTRAVENOUS at 05:35

## 2022-11-11 RX ADMIN — OXYCODONE AND ACETAMINOPHEN 2 TABLET: 5; 325 TABLET ORAL at 02:05

## 2022-11-11 RX ADMIN — KETOROLAC TROMETHAMINE 15 MG: 30 INJECTION, SOLUTION INTRAMUSCULAR; INTRAVENOUS at 23:43

## 2022-11-11 RX ADMIN — HYDROMORPHONE HYDROCHLORIDE 1 MG: 1 INJECTION, SOLUTION INTRAMUSCULAR; INTRAVENOUS; SUBCUTANEOUS at 12:10

## 2022-11-11 RX ADMIN — HYDROMORPHONE HYDROCHLORIDE 1 MG: 1 INJECTION, SOLUTION INTRAMUSCULAR; INTRAVENOUS; SUBCUTANEOUS at 07:29

## 2022-11-11 RX ADMIN — HYDROMORPHONE HYDROCHLORIDE 1 MG: 1 INJECTION, SOLUTION INTRAMUSCULAR; INTRAVENOUS; SUBCUTANEOUS at 03:34

## 2022-11-11 RX ADMIN — KETOROLAC TROMETHAMINE 15 MG: 30 INJECTION, SOLUTION INTRAMUSCULAR; INTRAVENOUS at 05:35

## 2022-11-11 RX ADMIN — KETOROLAC TROMETHAMINE 15 MG: 30 INJECTION, SOLUTION INTRAMUSCULAR; INTRAVENOUS at 17:15

## 2022-11-11 RX ADMIN — PROCHLORPERAZINE EDISYLATE 10 MG: 5 INJECTION INTRAMUSCULAR; INTRAVENOUS at 07:39

## 2022-11-11 RX ADMIN — ALPRAZOLAM 0.25 MG: 0.25 TABLET ORAL at 07:31

## 2022-11-11 RX ADMIN — OXYCODONE AND ACETAMINOPHEN 2 TABLET: 5; 325 TABLET ORAL at 08:52

## 2022-11-11 RX ADMIN — HYDROMORPHONE HYDROCHLORIDE 1 MG: 1 INJECTION, SOLUTION INTRAMUSCULAR; INTRAVENOUS; SUBCUTANEOUS at 20:09

## 2022-11-11 RX ADMIN — HYDROMORPHONE HYDROCHLORIDE 1 MG: 1 INJECTION, SOLUTION INTRAMUSCULAR; INTRAVENOUS; SUBCUTANEOUS at 16:09

## 2022-11-11 RX ADMIN — ALPRAZOLAM 0.25 MG: 0.25 TABLET ORAL at 20:09

## 2022-11-11 RX ADMIN — PANTOPRAZOLE SODIUM 20 MG: 20 TABLET, DELAYED RELEASE ORAL at 06:57

## 2022-11-11 RX ADMIN — SODIUM CHLORIDE, PRESERVATIVE FREE 10 ML: 5 INJECTION INTRAVENOUS at 14:00

## 2022-11-11 RX ADMIN — ONDANSETRON 4 MG: 2 INJECTION INTRAMUSCULAR; INTRAVENOUS at 17:27

## 2022-11-11 NOTE — PROGRESS NOTES
Pt unable to void, MD aware, ambulated to toilet, unable to void, order for straight cath X 1, pt aware, cathed with sterile technique by this RN with OSMAN Higuera present and assisting, 700cc urine evacuated from bladder    0945 - pt 86% at rest on room air, O2 reapplied

## 2022-11-11 NOTE — DISCHARGE INSTRUCTIONS
[address] [facility] Surgery at [address] Operative Instructions      Please call your surgeons  office for a 2 week follow-up appointment with Dr Lashae Christianson . Office address is: [address]  [facility] [place], [address]  [phone]      Discharge Instructions: You may resume your usual diet as well as your usual medications. You are not cleared to drive if you are taking narcotic pain medication. If you are only using tylenol for pain and are comfortable sitting in a car, you may drive. No heavy lifting. No strenuous exercise. You are cleared to go up and down stairs. You may shower but no baths or swimming. Your incisions dont need any special care. You can wash them gently with  warm soap and water daily and pat them dry. Your stitches are under the skin and dont need to be removed. Ask you doctor when you can return to work. Call our office at  [phone] to make a 2 week follow up appointment. Call our office with any problems, questions or concerns. Call our office if you have any     Fevers of 101 or higher   Worsening pain  Nausea/Vomiting  Difficulty eating or drinking  Incisions that are red, open, draining or tender.

## 2022-11-11 NOTE — PROGRESS NOTES
RUR: Observation     JUAN: Anticipated discharge home. Patient's  will provide transport home once medically stable. Follow-up with PCP/specialist.     Primary Contact: , Jo Ann Webb, [phone]    Care Management Interventions  PCP Verified by CM: Yes (Dr. Chapo Abernathy - seen about 6 months ago per patient)  Mode of Transport at Discharge: Other (see comment) ( )  Transition of Care Consult (CM Consult): Discharge Planning  Discharge Durable Medical Equipment: No  Physical Therapy Consult: No  Occupational Therapy Consult: No  Speech Therapy Consult: No  Support Systems: Spouse/Significant Other, Child(ian)  Confirm Follow Up Transport: Self  The Plan for Transition of Care is Related to the Following Treatment Goals : Home  Discharge Location  Patient Expects to be Discharged to[de-identified] Home with family assistance    Reason for Admission:  Recurrent incisional hernia                    RUR Score:      Observation            Plan for utilizing home health:    Likely none      PCP: First and Last name:  Bety Willis MD     Name of Practice:    Are you a current patient: Yes/No: Yes   Approximate date of last visit: About 6 months ago per patient   Can you participate in a virtual visit with your PCP: Yes                    Current Advanced Directive/Advance Care Plan: Full Code    Healthcare Decision Maker:   Click here to complete [address] including selection of the Healthcare Decision Maker Relationship (ie \"Primary\")             Primary Decision Maker: Sarina Piña - Spouse - [phone]                  Transition of Care Plan:      Home     CM met with patient at bedside to introduce self and explain role. Patient lives with his wife, daughter, son-in-law and 10 y.o. grandson in a 1 story home with 2 steps to enter. Patient was independent with ADL's, IADL's and ambulation. Patient owns a cane.  Per patient, she had home health about 2 years ago; however, cannot recall the name of company. CM verified patient's PCP, demographics and insurance. Preferred pharmacy is [facility] with no barriers obtaining needed prescriptions. Patient's family will provide transport home once medically stable.      Jhonatan Rodriguez, ISAIAH   [phone]

## 2022-11-11 NOTE — PROGRESS NOTES
Naomy Godinez provided to patient/representative with verbal explanation of the notice. Time allotted for questions regarding the notice. Patient /representative provided a completed copy of the VOON notice. Copy placed on bedside chart.   Jerry Pal, Care Management Assistant

## 2022-11-11 NOTE — ANESTHESIA POSTPROCEDURE EVALUATION
Procedure(s):  ROBTIC RECURRENT INCISIONAL HERNIA INCISIONAL HERNIA REPAIR WITH MESH x 3.    general    Anesthesia Post Evaluation      Multimodal analgesia: multimodal analgesia used between 6 hours prior to anesthesia start to PACU discharge  Patient location during evaluation: PACU  Level of consciousness: awake  Pain management: adequate  Airway patency: patent  Anesthetic complications: no  Cardiovascular status: acceptable  Respiratory status: acceptable  Hydration status: acceptable  Post anesthesia nausea and vomiting:  none  Final Post Anesthesia Temperature Assessment:  Normothermia (36.0-37.5 degrees C)      INITIAL Post-op Vital signs:   Vitals Value Taken Time   /74 11/10/22 1615   Temp 36.6 °C (97.9 °F) 11/10/22 1600   Pulse 70 11/10/22 1622   Resp 21 11/10/22 1622   SpO2 100 % 11/10/22 1622   Vitals shown include unvalidated device data.

## 2022-11-11 NOTE — OP NOTES
[address] [facility]  OPERATIVE REPORT    Name:  Jenniffer Estrada  MR#:  [id number]  :  1960  ACCOUNT #:  [de-identified]  DATE OF SERVICE:  11/10/2022    PREOPERATIVE DIAGNOSIS:  Recurrent incisional hernia x2. POSTOPERATIVE DIAGNOSIS:  Recurrent incisional hernia x3. PROCEDURE PERFORMED:  Robotic incarcerated incisional hernia repair with mesh x3 for three separate hernias. SURGEON:  Therese Hubbard MD    ASSISTANT:  Clark Martinez SA    ANESTHESIA:  General.    COMPLICATIONS:  None. SPECIMENS REMOVED:  none. IMPLANTS:  9cm Ventralight ST mesh upper abdomen, 9cm Ventralight St mesh lower midline, 11cm Ventralight ST mesh LLQ colostomy site. ESTIMATED BLOOD LOSS:  50cc. DRAINS:  None. FINDINGS:  Multiple recurrent incisional hernias. Epigastric hernia was repaired. It was 2 cm in size. It was repaired primarily and also with a 9 cm round Bard Ventralight ST mesh. The left lower quadrant colostomy site hernia had a 3 cm hernia which was repaired primarily, it also had an 11 cm Bard Ventralight ST mesh with Echo positioning device. The Echo positioning device was removed at the end of the case. The lower abdominal hernia was 2 cm and repaired with a 9 cm round Bard Ventralight ST mesh. All the hernias were repaired with an IPOM, intraperitoneal onlay mesh repair. Omentum was draped over the abdominal wall covering the intestines from the mesh. All these hernias were repaired separately with three separate defects and three separate mesh repairs. All were spaced 15+ cm apart, it could not be overlapped with single mesh.     INDICATIONS:  The patient is a 53-year-old female who has a history of perforated diverticulitis with colostomy, sigmoid resection, and colostomy takedown with prior incisional hernia repair with a 15 cm round piece of mesh done laparoscopically at another hospital.  She now has recurrence of her hernia above the hernia repair and at the colostomy site which is all needing repair in the operating room. We will also take a look and see if there was a hernia below the mesh placement where she is also complaining of pain. PROCEDURE:  The patient was met in the preoperative holding area. H and P was updated. Consent was signed. All risks and benefits were explained to the patient prior to the start of the operation. She was taken back to the operating room. She was lying in a supine position. The abdomen was prepped and draped in standard sterile fashion. Time-out was called. Antibiotics were given. SCDs were on lower extremities. Martinez catheter was inserted. We started the operation by making an 8 mm incision into the right upper quadrant, inserting a da Urszula VisiPort trocar into the intra-abdominal cavity, insufflating to 15 mmHg. I then placed an 8 mm trocar into the right mid abdomen and then a 12 mm trocar into the right lower quadrant. All ports were placed under direct visualization. There were no injuries to the underlying abdominal structures. We docked the [employer] robot onto the patient and then we started taking down adhesions in the midline. We could see that the patient did have a hernia in the upper midline in the epigastric region and we could see that the hernia was right on the edge of the mesh. We could not quite see the hernia in the colostomy site in the left lower quadrant yet, so we started taking down the adhesions. She had a lot of omental adhesions to the anterior abdominal wall into the previous mesh, so we dissected that down off of the abdominal wall, taking great care in doing that. Fortunately, there were no bowel adhesions to the anterior abdominal wall to the mesh. We took down those omental adhesions and cleared off the area where the hernia defect was in the epigastric region. It measured about 2 x 2 cm. We knew this would need repair with mesh.   We also would then continue dissecting into the lateral abdominal wall on the left side exposing the colostomy site hernia defect. We took all the omentum down off of the anterior abdominal wall and off of the mesh entirely. We could see that her colostomy site hernia was just past the edge of the mesh, indicating the patient did not have adequate overlap for her hernia repair or developed these hernias outside of the mesh later on. We continued taking down the adhesions in the lower midline and at first, we could not see any hernia in the lower midline. There was no defect that was seen in to the intra-abdominal cavity and there did not appear to be any omentum going into the anterior abdominal wall. So at this point, we would start repairing the epigastric hernia with an intraperitoneal onlay mesh technique. We also took down the falciform ligament with the scissors and cleared off the abdominal wall. After we had done that, we then closed the epigastric hernia defect with a 0 absorbable V-Loc suture in a running fashion. We then used a piece of Bard Ventralight ST mesh and cut it down to 9 cm around. It was a 9 cm round piece of Bard Ventralight ST mesh. We then had a central fixation Vicryl suture into the middle of that. We made a small incision just above the hernia repair site on the skin and introduced a suture passing device in through the middle of the fascial closure and then pulled up the strings on the central stitch on the mesh to center the mesh in the center of the hernia defect. The hernia defect repair was covered by the mesh. It was centered perfectly. We then sutured the mesh to the anterior abdominal wall overlapping with a portion of the previously placed mesh from her previous surgery years ago with a running 0 nonabsorbable V-Loc suture using two sutures to suture around circumferentially, suturing the mesh to the anterior abdominal wall. The mesh was secured. We had a primary repair and a mesh repair.     At this point, the lateral hernia was at least 15 or more centimeters away from the epigastric hernia and it was nowhere close. We would not be able to place one mesh to cover both of them since they were so far away and since the other defect was relatively small; the colostomy site hernia defect was about 3 x 3 cm. We would then close that primarily after dropping the pneumoperitoneum. We closed the hernia fascial defect with a 0 absorbable V-Loc suture. With the hernia defect closed, I then used an 11 cm round Bard Ventralight ST mesh with Echo positioning device, centering it over the hernia defect and pulling up to the abdominal wall with an Endo Close suture passing device. We inflated the balloon system on the mesh and pulled it up to the anterior abdominal wall. We then sutured circumferentially around to the anterior abdominal wall with a 0 nonabsorbable V-Loc suture, circumferentially around the mesh. The mesh was fixated to the abdominal wall. It was in good position. At this point, our two areas were repaired. We finished taking down some adhesions in the lower midline. After we finished taking down these adhesions in the lower midline, we could see that the patient actually did have a third hernia at the inferior portion of her mesh repair. All of her hernia defects were on the edge of the mesh in separate quadrants and could not be covered by one intraperitoneal mesh. We then would measure this defect. It measured 2 x 2 cm. We closed the fascial defect with a 0 absorbable V-Loc suture in a running fashion and then we would reinforce the repair with a 9 cm round Bard Ventralight ST mesh with a central fixation suture in the middle, using a suture passing device to pull that up and center it over the mesh. We then sutured around it and sutured it to the anterior abdominal wall overlapping with some of the other mesh with a 0 nonabsorbable V-Loc suture in a running fashion.   The mesh was secured to the anterior abdominal wall. It was in place and taut with adequate overlap around the hernia defect. We then were satisfied with our hernia repairs. We also did take down some small bowel adhesions which were adherent to the anterior abdominal wall, but were not adherent to the mesh. There was a lot of omentum covering up over the bowel and we sutured some of that to the anterior abdominal wall to have a line of omentum covering up the mesh so that the small bowel would not be adherent to the newly placed mesh. We sutured that up to the anterior abdominal wall with 3-0 absorbable V-Loc suture. At this point, the hernias were repaired. Everything looked good in the abdominal cavity. We then undocked the Fitcline. We then closed our 12 mm trocar site fascial defect with an Endo Close suture passing device in an interrupted figure-of-eight fashion with a 0 Vicryl suture. We then desufflated the abdominal cavity, removed the trocars, and closed the skin with 4-0 Monocryl and Dermabond to complete the operation. Dr. Meghna Puente was present and scrubbed during the entire operation. The counts were correct.       MD SUKHDEEP [id number]  D:  11/10/2022 16:32  T:  11/10/2022 21:37  JOB #:  [id number]

## 2022-11-11 NOTE — PROGRESS NOTES
Problem: Mobility Impaired (Adult and Pediatric)  Goal: *Acute Goals and Plan of Care (Insert Text)  Description: FUNCTIONAL STATUS PRIOR TO ADMISSION: Patient was independent and active without use of DME.    HOME SUPPORT PRIOR TO ADMISSION: The patient lived with spouse and family but did not require assist.    Physical Therapy Goals  Initiated 11/11/2022  1. Patient will move from supine to sit and sit to supine  in bed with independence within 7 day(s). 2.  Patient will transfer from bed to chair and chair to bed with modified independence using the least restrictive device within 7 day(s). 3.  Patient will perform sit to stand with modified independence within 7 day(s). 4.  Patient will ambulate with modified independence for 120 feet with the least restrictive device within 7 day(s). 5.  Patient will ascend/descend 3 stairs with 1 handrail(s) with modified independence within 7 day(s). Outcome: Progressing Towards Goal   PHYSICAL THERAPY EVALUATION  Patient: Hemal Yu (63 y.o. female)  Date: 11/11/2022  Primary Diagnosis: Recurrent incisional hernia [K43.2]  Procedure(s) (LRB):  ROBTIC RECURRENT INCISIONAL HERNIA INCISIONAL HERNIA REPAIR WITH MESH x 3 (N/A) 1 Day Post-Op   Precautions:        ASSESSMENT  Based on the objective data described below, the patient presents with decreased independence with functional mobility post-op day 1 incisional hernia repair. She reports severe pain 7-8/10 throughout mobility. Patient demonstrates the need for Min A for rolling to achieve supine to sit with reduced twisting. Patient performs sit<>stand slowly with external support. She is able to ambulate and transfer with CGA and no AD. Patient is unable to void during PT session. Patient is left on 3L/min O2 due to reports of SOB after gait training. Patient uses 2L/min at night.      Current Level of Function Impacting Discharge (mobility/balance): Min A for rolling, CGA gait and transfers without AD    Functional Outcome Measure: The patient scored 19/28 on the Tinetti outcome measure. Other factors to consider for discharge: medical stability, decreased balance, increased pain     Patient will benefit from skilled therapy intervention to address the above noted impairments. PLAN :  Recommendations and Planned Interventions: bed mobility training, transfer training, gait training, therapeutic exercises, neuromuscular re-education, edema management/control, patient and family training/education, and therapeutic activities      Frequency/Duration: Patient will be followed by physical therapy:  5 times a week to address goals. Recommendation for discharge: (in order for the patient to meet his/her long term goals)  Physical therapy at least 2 days/week in the home AND ensure assist and/or supervision for safety with functional mobility    This discharge recommendation:  Has not yet been discussed the attending provider and/or case management    IF patient discharges home will need the following DME: to be determined (TBD)         SUBJECTIVE:   Patient stated It just hurts so bad.     OBJECTIVE DATA SUMMARY:   HISTORY:    Past Medical History:   Diagnosis Date    Arthritis     Asthma     Chronic obstructive pulmonary disease ([facility] [address] PAH- 11/01/2021    Chronic pain     GERD (gastroesophageal reflux disease)     Graves disease     Nausea & vomiting 2020 - current    abdominal pain    Other ill-defined conditions(799.89)     thyroid dysfunction    PAH (pulmonary artery hypertension) ([facility] [address]     SEES DR. CELI SCOTT    Sleep apnea     LEA - SEES DR. MELISA BERKOWITZ ZOLTE    Stool color black      Past Surgical History:   Procedure Laterality Date    HX APPENDECTOMY  1973    HX GYN  Ovaries 1998    HX HEENT  Graves Disease    4 surgeries    HX HERNIA REPAIR  2020    HX ORTHOPAEDIC  foot injury, left     HX OTHER SURGICAL  2022    IN THE LAST 2 YEARS AT [facility], COLON RUPTURED, HAD TEMPORARY COLOSTOMY , THEN COLOSTOMY REVERSAL SURGERY       Personal factors and/or comorbidities impacting plan of care:     Home Situation  Home Environment: Private residence  # Steps to Enter: 3  Rails to Enter: Yes  Hand Rails : Right  One/Two Story Residence: One story  Living Alone: No  Support Systems: Spouse/Significant Other, Child(ian)  Patient Expects to be Discharged to[de-identified] Home with family assistance  Current DME Used/Available at Home: Cane, straight, Grab bars, Walker, rolling  Tub or Shower Type: Shower    EXAMINATION/PRESENTATION/DECISION MAKING:   Critical Behavior:  Neurologic State: Alert, Agitated  Orientation Level: Oriented X4  Cognition: Follows commands     Hearing:     Skin:    Edema:   Range Of Motion:  AROM: Generally decreased, functional           PROM: Generally decreased, functional           Strength:    Strength: Generally decreased, functional                    Tone & Sensation:   Tone: Normal              Sensation: Intact               Coordination:  Coordination: Within functional limits  Vision:      Functional Mobility:  Bed Mobility:     Supine to Sit: Minimum assistance  Sit to Supine: Contact guard assistance  Scooting: Contact guard assistance  Transfers:  Sit to Stand: Contact guard assistance  Stand to Sit: Contact guard assistance        Bed to Chair: Contact guard assistance              Balance:   Sitting: Intact; Without support  Standing: Impaired; Without support  Standing - Static: Constant support;Good  Standing - Dynamic : Constant support; Fair  Ambulation/Gait Training:  Distance (ft): 45 Feet (ft)  Assistive Device: Gait belt  Ambulation - Level of Assistance: Contact guard assistance        Gait Abnormalities: Decreased step clearance; Antalgic              Speed/Alicia: Slow;Shuffled  Step Length: Right shortened;Left shortened                     Stairs:               Therapeutic Exercises:       Functional Measure:  Tinetti test:    Sitting Balance: 1  Arises: 1  Attempts to Rise: 1  Immediate Standing Balance: 0  Standing Balance: 1  Nudged: 2  Eyes Closed: 0  Turn 360 Degrees - Continuous/Discontinuous: 1  Turn 360 Degrees - Steady/Unsteady: 1  Sitting Down: 1  Balance Score: 9 Balance total score  Indication of Gait: 1  R Step Length/Height: 1  L Step Length/Height: 1  R Foot Clearance: 1  L Foot Clearance: 1  Step Symmetry: 1  Step Continuity: 1  Path: 1  Trunk: 1  Walking Time: 1  Gait Score: 10 Gait total score  Total Score: 19/28 Overall total score         Tinetti Tool Score Risk of Falls  <19 = High Fall Risk  19-24 = Moderate Fall Risk  25-28 = Low Fall Risk  Tinetti ME. Performance-Oriented Assessment of Mobility Problems in Elderly Patients. [address]; [id number]. (Scoring Description: PT Bulletin Feb. 10, 1993)    Older adults: Sigrid Smith et al, 2009; n = 1000 [facility] elderly evaluated with ABC, BERT, ADL, and IADL)  · Mean BERT score for males aged 69-68 years = 26.21(3.40)  · Mean BERT score for females age 69-68 years = 25.16(4.30)  · Mean BERT score for males over 80 years = 23.29(6.02)  · Mean BERT score for females over 80 years = 17.20(8.32)                Physical Therapy Evaluation Charge Determination   History Examination Presentation Decision-Making   MEDIUM  Complexity : 1-2 comorbidities / personal factors will impact the outcome/ POC  LOW Complexity : 1-2 Standardized tests and measures addressing body structure, function, activity limitation and / or participation in recreation  MEDIUM Complexity : Evolving with changing characteristics  Other outcome measures Tinetti  MEDIUM      Based on the above components, the patient evaluation is determined to be of the following complexity level: MEDIUM    Pain Rating:      Activity Tolerance:   Good    After treatment patient left in no apparent distress:   Supine in bed, Call bell within reach, Bed / chair alarm activated, and Side rails x 3    COMMUNICATION/EDUCATION:   The patients plan of care was discussed with: Registered nurse. Fall prevention education was provided and the patient/caregiver indicated understanding., Patient/family have participated as able in goal setting and plan of care. , and Patient/family agree to work toward stated goals and plan of care.     Thank you for this referral.  Steve Opitz, PT   Time Calculation: 26 mins

## 2022-11-11 NOTE — PROGRESS NOTES
Patient unable to void after wade removal, bladder scanned and showed >650ml. Patient straight cathed and 900ml urine emptied and recorded. Patient educated on the need to void by the next 6 hours or will have to be straight cathed again. Patient understands protocol, stated relief of some pain after bladder emptied. Percocet given for abdominal pain.

## 2022-11-12 LAB
ANION GAP SERPL CALC-SCNC: 3 MMOL/L (ref 5–15)
BUN SERPL-MCNC: 13 MG/DL (ref 6–20)
BUN/CREAT SERPL: 30 (ref 12–20)
CALCIUM SERPL-MCNC: 8.5 MG/DL (ref 8.5–10.1)
CHLORIDE SERPL-SCNC: 102 MMOL/L (ref 97–108)
CO2 SERPL-SCNC: 31 MMOL/L (ref 21–32)
CREAT SERPL-MCNC: 0.43 MG/DL (ref 0.55–1.02)
ERYTHROCYTE [DISTWIDTH] IN BLOOD BY AUTOMATED COUNT: 12 % (ref 11.5–14.5)
GLUCOSE SERPL-MCNC: 120 MG/DL (ref 65–100)
HCT VFR BLD AUTO: 38.8 % (ref 35–47)
HGB BLD-MCNC: 12.5 G/DL (ref 11.5–16)
MCH RBC QN AUTO: 32.6 PG (ref 26–34)
MCHC RBC AUTO-ENTMCNC: 32.2 G/DL (ref 30–36.5)
MCV RBC AUTO: 101 FL (ref 80–99)
NRBC # BLD: 0 K/UL (ref 0–0.01)
NRBC BLD-RTO: 0 PER 100 WBC
PLATELET # BLD AUTO: 262 K/UL (ref 150–400)
PMV BLD AUTO: 9.5 FL (ref 8.9–12.9)
POTASSIUM SERPL-SCNC: 4.1 MMOL/L (ref 3.5–5.1)
RBC # BLD AUTO: 3.84 M/UL (ref 3.8–5.2)
SODIUM SERPL-SCNC: 136 MMOL/L (ref 136–145)
WBC # BLD AUTO: 8.9 K/UL (ref 3.6–11)

## 2022-11-12 PROCEDURE — 94640 AIRWAY INHALATION TREATMENT: CPT

## 2022-11-12 PROCEDURE — 74011250637 HC RX REV CODE- 250/637: Performed by: SURGERY

## 2022-11-12 PROCEDURE — 96372 THER/PROPH/DIAG INJ SC/IM: CPT

## 2022-11-12 PROCEDURE — 36415 COLL VENOUS BLD VENIPUNCTURE: CPT

## 2022-11-12 PROCEDURE — 85027 COMPLETE CBC AUTOMATED: CPT

## 2022-11-12 PROCEDURE — 96376 TX/PRO/DX INJ SAME DRUG ADON: CPT

## 2022-11-12 PROCEDURE — 80048 BASIC METABOLIC PNL TOTAL CA: CPT

## 2022-11-12 PROCEDURE — 74011250636 HC RX REV CODE- 250/636: Performed by: SURGERY

## 2022-11-12 PROCEDURE — G0378 HOSPITAL OBSERVATION PER HR: HCPCS

## 2022-11-12 PROCEDURE — 96375 TX/PRO/DX INJ NEW DRUG ADDON: CPT

## 2022-11-12 PROCEDURE — 94664 DEMO&/EVAL PT USE INHALER: CPT

## 2022-11-12 PROCEDURE — 74011000250 HC RX REV CODE- 250: Performed by: SURGERY

## 2022-11-12 RX ORDER — CLONIDINE HYDROCHLORIDE 0.1 MG/1
0.2 TABLET ORAL
Status: DISCONTINUED | OUTPATIENT
Start: 2022-11-12 | End: 2022-11-14 | Stop reason: HOSPADM

## 2022-11-12 RX ORDER — LORAZEPAM 1 MG/1
1 TABLET ORAL
Status: DISCONTINUED | OUTPATIENT
Start: 2022-11-12 | End: 2022-11-13

## 2022-11-12 RX ORDER — ACETAMINOPHEN 325 MG/1
650 TABLET ORAL
Status: DISCONTINUED | OUTPATIENT
Start: 2022-11-12 | End: 2022-11-14 | Stop reason: HOSPADM

## 2022-11-12 RX ORDER — DOCUSATE SODIUM 100 MG/1
100 CAPSULE, LIQUID FILLED ORAL 2 TIMES DAILY
Status: DISCONTINUED | OUTPATIENT
Start: 2022-11-12 | End: 2022-11-14 | Stop reason: HOSPADM

## 2022-11-12 RX ADMIN — HYDROMORPHONE HYDROCHLORIDE 1 MG: 1 INJECTION, SOLUTION INTRAMUSCULAR; INTRAVENOUS; SUBCUTANEOUS at 05:15

## 2022-11-12 RX ADMIN — KETOROLAC TROMETHAMINE 15 MG: 30 INJECTION, SOLUTION INTRAMUSCULAR; INTRAVENOUS at 05:14

## 2022-11-12 RX ADMIN — DOCUSATE SODIUM 100 MG: 100 CAPSULE, LIQUID FILLED ORAL at 17:56

## 2022-11-12 RX ADMIN — KETOROLAC TROMETHAMINE 15 MG: 30 INJECTION, SOLUTION INTRAMUSCULAR; INTRAVENOUS at 13:14

## 2022-11-12 RX ADMIN — KETOROLAC TROMETHAMINE 15 MG: 30 INJECTION, SOLUTION INTRAMUSCULAR; INTRAVENOUS at 23:46

## 2022-11-12 RX ADMIN — OXYCODONE AND ACETAMINOPHEN 1 TABLET: 5; 325 TABLET ORAL at 03:06

## 2022-11-12 RX ADMIN — LEVOTHYROXINE SODIUM 112 MCG: 0.11 TABLET ORAL at 07:12

## 2022-11-12 RX ADMIN — ALBUTEROL SULFATE 2.5 MG: 2.5 SOLUTION RESPIRATORY (INHALATION) at 17:10

## 2022-11-12 RX ADMIN — ENOXAPARIN SODIUM 40 MG: 100 INJECTION SUBCUTANEOUS at 09:22

## 2022-11-12 RX ADMIN — ONDANSETRON 4 MG: 2 INJECTION INTRAMUSCULAR; INTRAVENOUS at 22:02

## 2022-11-12 RX ADMIN — HYDRALAZINE HYDROCHLORIDE 20 MG: 20 INJECTION INTRAMUSCULAR; INTRAVENOUS at 23:46

## 2022-11-12 RX ADMIN — HYDROMORPHONE HYDROCHLORIDE 1 MG: 1 INJECTION, SOLUTION INTRAMUSCULAR; INTRAVENOUS; SUBCUTANEOUS at 17:56

## 2022-11-12 RX ADMIN — TRAZODONE HYDROCHLORIDE 50 MG: 50 TABLET ORAL at 20:47

## 2022-11-12 RX ADMIN — LORAZEPAM 1 MG: 1 TABLET ORAL at 04:29

## 2022-11-12 RX ADMIN — HYDROMORPHONE HYDROCHLORIDE 1 MG: 1 INJECTION, SOLUTION INTRAMUSCULAR; INTRAVENOUS; SUBCUTANEOUS at 21:57

## 2022-11-12 RX ADMIN — HYDRALAZINE HYDROCHLORIDE 20 MG: 20 INJECTION INTRAMUSCULAR; INTRAVENOUS at 16:11

## 2022-11-12 RX ADMIN — ONDANSETRON 4 MG: 2 INJECTION INTRAMUSCULAR; INTRAVENOUS at 13:24

## 2022-11-12 RX ADMIN — DOCUSATE SODIUM 100 MG: 100 CAPSULE, LIQUID FILLED ORAL at 13:14

## 2022-11-12 RX ADMIN — LORAZEPAM 1 MG: 1 TABLET ORAL at 11:32

## 2022-11-12 RX ADMIN — SODIUM CHLORIDE, PRESERVATIVE FREE 10 ML: 5 INJECTION INTRAVENOUS at 13:28

## 2022-11-12 RX ADMIN — HYDROMORPHONE HYDROCHLORIDE 1 MG: 1 INJECTION, SOLUTION INTRAMUSCULAR; INTRAVENOUS; SUBCUTANEOUS at 09:12

## 2022-11-12 RX ADMIN — HYDROMORPHONE HYDROCHLORIDE 1 MG: 1 INJECTION, SOLUTION INTRAMUSCULAR; INTRAVENOUS; SUBCUTANEOUS at 13:37

## 2022-11-12 RX ADMIN — PANTOPRAZOLE SODIUM 20 MG: 20 TABLET, DELAYED RELEASE ORAL at 07:12

## 2022-11-12 RX ADMIN — ONDANSETRON 4 MG: 2 INJECTION INTRAMUSCULAR; INTRAVENOUS at 09:15

## 2022-11-12 RX ADMIN — LORAZEPAM 1 MG: 1 TABLET ORAL at 19:09

## 2022-11-12 RX ADMIN — KETOROLAC TROMETHAMINE 15 MG: 30 INJECTION, SOLUTION INTRAMUSCULAR; INTRAVENOUS at 17:50

## 2022-11-12 RX ADMIN — HYDROMORPHONE HYDROCHLORIDE 1 MG: 1 INJECTION, SOLUTION INTRAMUSCULAR; INTRAVENOUS; SUBCUTANEOUS at 00:14

## 2022-11-12 NOTE — PROGRESS NOTES
Pt experienced 3 panic attacks since 2230. Pt continuously ripping clothes off, sweating, and shaking. On call physician Luis A paged and order placed for 1 mg Ativan PO Q8 PRN for anxiety.

## 2022-11-12 NOTE — PROGRESS NOTES
Bedside shift change report given to Rand Oneal RN (oncoming nurse) by Maura Hyde RN (offgoing nurse). Report included the following information SBAR, Kardex, Intake/Output, MAR, and Recent Results.

## 2022-11-12 NOTE — PROGRESS NOTES
Progress Note    Patient: Nick Dewitt MRN: [id number]  SSN: [id number]    YOB: 1960  Age: 63 y.o. Sex: female      Admit Date: 11/10/2022    2 Days Post-Op    Procedure:  Procedure(s):  ROBTIC RECURRENT INCISIONAL HERNIA INCISIONAL HERNIA REPAIR WITH MESH x 3    Subjective:     No acute surgical issues. Pt was unable to void last night so wade had to be inserted. Pt reported having voiding problem during previous surgery as well. Tolerating diet. Reported incisional pain.      Objective:     Visit Vitals  BP (!) 143/75   Pulse 79   Temp 97.5 °F (36.4 °C)   Resp 18   Ht 5' 4\" (1.626 m)   Wt 168 lb (76.2 kg)   SpO2 98%   BMI 28.84 kg/m²       Temp (24hrs), Av.7 °F (36.5 °C), Min:97.5 °F (36.4 °C), Max:97.9 °F (36.6 °C)      Physical Exam:    Gen:  NAD  Pulm:  Unlabored  Abd:  S/ND/appropriate TTP  Wound:  C/D/I    Recent Results (from the past 24 hour(s))   METABOLIC PANEL, BASIC    Collection Time: 22  3:15 AM   Result Value Ref Range    Sodium 136 136 - 145 mmol/L    Potassium 4.1 3.5 - 5.1 mmol/L    Chloride 102 97 - 108 mmol/L    CO2 31 21 - 32 mmol/L    Anion gap 3 (L) 5 - 15 mmol/L    Glucose 120 (H) 65 - 100 mg/dL    BUN 13 6 - 20 MG/DL    Creatinine 0.43 (L) 0.55 - 1.02 MG/DL    BUN/Creatinine ratio 30 (H) 12 - 20      eGFR >60 >60 ml/min/1.73m2    Calcium 8.5 8.5 - 10.1 MG/DL   CBC W/O DIFF    Collection Time: 22  3:15 AM   Result Value Ref Range    WBC 8.9 3.6 - 11.0 K/uL    RBC 3.84 3.80 - 5.20 M/uL    HGB 12.5 11.5 - 16.0 g/dL    HCT 38.8 35.0 - 47.0 %    .0 (H) 80.0 - 99.0 FL    MCH 32.6 26.0 - 34.0 PG    MCHC 32.2 30.0 - 36.5 g/dL    RDW 12.0 11.5 - 14.5 %    PLATELET 317 578 - 266 K/uL    MPV 9.5 8.9 - 12.9 FL    NRBC 0.0 0  WBC    ABSOLUTE NRBC 0.00 0.00 - 0.01 K/uL       Assessment:     Hospital Problems  Date Reviewed: 10/6/2022            Codes Class Noted POA    Recurrent incisional hernia ICD-10-CM: K43.2  ICD-9-CM: 553.21  11/10/2022 Unknown Plan/Recommendations/Medical Decision Making:     - Diet as tolerated  - Pain control  - Out of bed.   Encourage ambulation  - Urinary retention:  Keep wade for now and remove wade tomorrow morning  - Possible DC home tomorrow or Monday

## 2022-11-13 PROCEDURE — G0378 HOSPITAL OBSERVATION PER HR: HCPCS

## 2022-11-13 PROCEDURE — 96375 TX/PRO/DX INJ NEW DRUG ADDON: CPT

## 2022-11-13 PROCEDURE — 74011250637 HC RX REV CODE- 250/637: Performed by: SURGERY

## 2022-11-13 PROCEDURE — 96372 THER/PROPH/DIAG INJ SC/IM: CPT

## 2022-11-13 PROCEDURE — 74011000250 HC RX REV CODE- 250: Performed by: SURGERY

## 2022-11-13 PROCEDURE — 96376 TX/PRO/DX INJ SAME DRUG ADON: CPT

## 2022-11-13 PROCEDURE — 74011250636 HC RX REV CODE- 250/636: Performed by: SURGERY

## 2022-11-13 RX ORDER — DIPHENHYDRAMINE HYDROCHLORIDE 50 MG/ML
12.5 INJECTION, SOLUTION INTRAMUSCULAR; INTRAVENOUS
Status: DISCONTINUED | OUTPATIENT
Start: 2022-11-13 | End: 2022-11-14 | Stop reason: HOSPADM

## 2022-11-13 RX ADMIN — KETOROLAC TROMETHAMINE 15 MG: 30 INJECTION, SOLUTION INTRAMUSCULAR; INTRAVENOUS at 12:10

## 2022-11-13 RX ADMIN — HYDRALAZINE HYDROCHLORIDE 20 MG: 20 INJECTION INTRAMUSCULAR; INTRAVENOUS at 07:03

## 2022-11-13 RX ADMIN — HYDROMORPHONE HYDROCHLORIDE 1 MG: 1 INJECTION, SOLUTION INTRAMUSCULAR; INTRAVENOUS; SUBCUTANEOUS at 03:19

## 2022-11-13 RX ADMIN — KETOROLAC TROMETHAMINE 15 MG: 30 INJECTION, SOLUTION INTRAMUSCULAR; INTRAVENOUS at 18:23

## 2022-11-13 RX ADMIN — ALPRAZOLAM 0.25 MG: 0.25 TABLET ORAL at 01:07

## 2022-11-13 RX ADMIN — DOCUSATE SODIUM 100 MG: 100 CAPSULE, LIQUID FILLED ORAL at 10:21

## 2022-11-13 RX ADMIN — ENOXAPARIN SODIUM 40 MG: 100 INJECTION SUBCUTANEOUS at 10:26

## 2022-11-13 RX ADMIN — DOCUSATE SODIUM 100 MG: 100 CAPSULE, LIQUID FILLED ORAL at 18:21

## 2022-11-13 RX ADMIN — SODIUM CHLORIDE, PRESERVATIVE FREE 10 ML: 5 INJECTION INTRAVENOUS at 14:00

## 2022-11-13 RX ADMIN — PANTOPRAZOLE SODIUM 20 MG: 20 TABLET, DELAYED RELEASE ORAL at 06:55

## 2022-11-13 RX ADMIN — LEVOTHYROXINE SODIUM 112 MCG: 0.11 TABLET ORAL at 06:55

## 2022-11-13 RX ADMIN — ONDANSETRON 4 MG: 2 INJECTION INTRAMUSCULAR; INTRAVENOUS at 10:16

## 2022-11-13 RX ADMIN — OXYCODONE AND ACETAMINOPHEN 2 TABLET: 5; 325 TABLET ORAL at 10:21

## 2022-11-13 RX ADMIN — PROCHLORPERAZINE EDISYLATE 10 MG: 5 INJECTION INTRAMUSCULAR; INTRAVENOUS at 03:19

## 2022-11-13 RX ADMIN — KETOROLAC TROMETHAMINE 15 MG: 30 INJECTION, SOLUTION INTRAMUSCULAR; INTRAVENOUS at 06:55

## 2022-11-13 RX ADMIN — OXYCODONE AND ACETAMINOPHEN 2 TABLET: 5; 325 TABLET ORAL at 14:14

## 2022-11-13 RX ADMIN — OXYCODONE AND ACETAMINOPHEN 1 TABLET: 5; 325 TABLET ORAL at 18:22

## 2022-11-14 VITALS
TEMPERATURE: 97.5 F | SYSTOLIC BLOOD PRESSURE: 145 MMHG | BODY MASS INDEX: 28.68 KG/M2 | DIASTOLIC BLOOD PRESSURE: 80 MMHG | OXYGEN SATURATION: 92 % | HEIGHT: 64 IN | HEART RATE: 107 BPM | WEIGHT: 168 LBS | RESPIRATION RATE: 18 BRPM

## 2022-11-14 PROCEDURE — 74011000250 HC RX REV CODE- 250: Performed by: SURGERY

## 2022-11-14 PROCEDURE — G0378 HOSPITAL OBSERVATION PER HR: HCPCS

## 2022-11-14 PROCEDURE — 74011250636 HC RX REV CODE- 250/636: Performed by: SURGERY

## 2022-11-14 PROCEDURE — 96376 TX/PRO/DX INJ SAME DRUG ADON: CPT

## 2022-11-14 PROCEDURE — 51798 US URINE CAPACITY MEASURE: CPT

## 2022-11-14 PROCEDURE — 74011250637 HC RX REV CODE- 250/637: Performed by: SURGERY

## 2022-11-14 PROCEDURE — 96372 THER/PROPH/DIAG INJ SC/IM: CPT

## 2022-11-14 RX ORDER — ONDANSETRON 4 MG/1
4 TABLET, ORALLY DISINTEGRATING ORAL
Qty: 20 TABLET | Refills: 0 | Status: SHIPPED | OUTPATIENT
Start: 2022-11-14

## 2022-11-14 RX ADMIN — ENOXAPARIN SODIUM 40 MG: 100 INJECTION SUBCUTANEOUS at 08:12

## 2022-11-14 RX ADMIN — KETOROLAC TROMETHAMINE 15 MG: 30 INJECTION, SOLUTION INTRAMUSCULAR; INTRAVENOUS at 00:36

## 2022-11-14 RX ADMIN — KETOROLAC TROMETHAMINE 15 MG: 30 INJECTION, SOLUTION INTRAMUSCULAR; INTRAVENOUS at 12:09

## 2022-11-14 RX ADMIN — LEVOTHYROXINE SODIUM 112 MCG: 0.11 TABLET ORAL at 07:18

## 2022-11-14 RX ADMIN — OXYCODONE AND ACETAMINOPHEN 1 TABLET: 5; 325 TABLET ORAL at 09:14

## 2022-11-14 RX ADMIN — OXYCODONE AND ACETAMINOPHEN 2 TABLET: 5; 325 TABLET ORAL at 04:06

## 2022-11-14 RX ADMIN — KETOROLAC TROMETHAMINE 15 MG: 30 INJECTION, SOLUTION INTRAMUSCULAR; INTRAVENOUS at 07:18

## 2022-11-14 RX ADMIN — OXYCODONE AND ACETAMINOPHEN 2 TABLET: 5; 325 TABLET ORAL at 14:01

## 2022-11-14 RX ADMIN — SODIUM CHLORIDE, PRESERVATIVE FREE 10 ML: 5 INJECTION INTRAVENOUS at 07:19

## 2022-11-14 RX ADMIN — PANTOPRAZOLE SODIUM 20 MG: 20 TABLET, DELAYED RELEASE ORAL at 07:23

## 2022-11-14 RX ADMIN — DOCUSATE SODIUM 100 MG: 100 CAPSULE, LIQUID FILLED ORAL at 08:12

## 2022-11-14 RX ADMIN — SODIUM CHLORIDE, PRESERVATIVE FREE 10 ML: 5 INJECTION INTRAVENOUS at 00:36

## 2022-11-14 NOTE — PROGRESS NOTES
I have reviewed discharge instructions with the patient. The patient verbalized understanding. Discharge medications reviewed with patient and appropriate educational materials and side effects teaching were provided.  PIV removed

## 2022-11-14 NOTE — DISCHARGE SUMMARY
Medicare Wellness Visit  Plan for Preventive Care    A good way for you to stay healthy is to use preventive care.  Medicare covers many services that can help you stay healthy.* The goal of these services is to find any health problems as quickly as possible. Finding problems early can help make them easier to treat.  Your personal plan below lists the services you may need and when they are due.     Health Maintenance Summary     Topic Due On Due Status Completed On Postpone Until Reason    MAMMOGRAM - BREAST CANCER SCREENING Dec 16, 2015 Postponed Dec 16, 2013 Jul 28, 2017 Test ordered & Appt scheduled to perform    Osteoporosis Screening  Completed Nov 4, 2010      Colorectal Cancer Screening - Cologuard  Dec 13, 2019 Not Due Dec 13, 2016      Immunization-Zoster  Completed Jul 17, 2012      Immunization - Pneumococcal  Completed Nov 3, 2016      Immunization - TDAP Pregnancy  Hidden       Medicare Wellness Visit Dec 1, 2016 Overdue Dec 1, 2015      IMMUNIZATION - DTaP/Tdap/Td Apr 18, 2026 Not Due Apr 18, 2016      Immunization-Influenza Sep 1, 2017 Not Due Sep 7, 2016             Preventive Care for Women and Men    Heart Screenings (Cardiovascular):  · Blood tests are used to check your cholesterol, lipid and triglyceride levels. High levels can increase your risk for heart disease and stroke. High levels can be treated with medications, diet and exercise. Lowering your levels can help keep your heart and blood vessels healthy.  Your provider will order these tests if they are needed.    · An ultrasound is done to see if you have an abdominal aortic aneurysm (AAA).  This is an enlargement of one of the main blood vessels that delivers blood to the body.   In the [place], 9,000 deaths are caused by AAA.  You may not even know you have this problem and as many as 1 in 3 people will have a serious problem if it is not treated.  Early diagnosis allows for more effective treatment and cure.  If you have a  Physician Discharge Summary     Patient ID:  Nu Meneses  [id number]  23 y.o.  1960    Admit Date: 11/10/2022    Discharge Date: 11/14/2022    Admission Diagnoses: Recurrent incisional hernia [K43.2]    Discharge Diagnoses: Active Problems:    Recurrent incisional hernia (11/10/2022)         Admission Condition: Fair    Discharge Condition: Good    Last Procedure: Procedure(s):  ROBTIC RECURRENT INCISIONAL HERNIA INCISIONAL HERNIA REPAIR WITH MESH x 3      Hospital Course:   Normal hospital course for this procedure. ROBOTIC RECURRENT INCARECERATED INCISIONAL HERNIA INCISIONAL HERNIA REPAIR WITH MESH x 3. Patient tolerating diet without n/v. Pain well controlled with pain meds. Martinez discontinued and patient was able to void after removed. Discharged home with follow up in 2 weeks. Consults: None    Disposition: home    Patient Instructions:   Current Discharge Medication List        START taking these medications    Details   ondansetron (ZOFRAN ODT) 4 mg disintegrating tablet Take 1 Tablet by mouth every eight (8) hours as needed for Nausea or Vomiting. Qty: 20 Tablet, Refills: 0      oxyCODONE-acetaminophen (PERCOCET) 5-325 mg per tablet Take 1 Tablet by mouth every six (6) hours as needed for Pain for up to 7 days. Max Daily Amount: 4 Tablets. Qty: 30 Tablet, Refills: 0    Associated Diagnoses: Recurrent incisional hernia           CONTINUE these medications which have CHANGED    Details   ibuprofen (MOTRIN) 800 mg tablet Take 1 Tablet by mouth every six (6) hours as needed for Pain. Qty: 30 Tablet, Refills: 0           CONTINUE these medications which have NOT CHANGED    Details   levothyroxine (SYNTHROID) 112 mcg tablet Take 112 mcg by mouth Daily (before breakfast). traZODone (DESYREL) 50 mg tablet Take 50 mg by mouth nightly as needed for Sleep. ALPRAZolam (XANAX) 0.25 mg tablet Take 0.25 mg by mouth nightly as needed for Anxiety.       pantoprazole (PROTONIX) 20 mg tablet Take 20 family history of AAA or are a male age 65-75 who has smoked, you are at higher risk of an AAA.  Your provider can order this test, if needed.    Colorectal Screening:  · There are many tests that are used to check for cancer of your colon and rectum. You and your provider should discuss what test is best for you and when to have it done.  Options include:  · Screening Colonoscopy: exam of the entire colon, seen through a flexible lighted tube.  · Flexible Sigmoidoscopy: exam of the last third (sigmoid portion) of the colon and rectum, seen through a flexible lighted tube.  · Cologuard DNA stool test: a sample of your stool is used to screen for cancer and unseen blood in your stool.  · Fecal Occult Blood Test: a sample of your stool is studied to find any unseen blood    Flu Shot:  · An immunization that helps to prevent influenza (the flu). You should get this every year. The best time to get the shot is in the fall.    Pneumococcal Shot:  • Vaccines are available that can help prevent pneumococcal disease, which is any type of infection caused by Streptococcus pneumoniae bacteria.   Their use can prevent some cases of pneumonia, meningitis, and sepsis. There are two types of pneumococcal vaccines:   o Conjugate vaccines (PCV-13 or Prevnar 13®) - helps protect against the 13 types of pneumococcal bacteria that are the most common causes of serious infections in children and adults.    o Polysaccharide vaccine (PPSV23 or Bgpolzoec07®) - helps protect against 23 types of pneumococcal bacteria for patients who are recommended to get it.  These vaccines should be given at least 12 months apart.  A booster is usually not needed.     Hepatitis B Shot:  · An immunization that helps to protect people from getting Hepatitis B. Hepatitis B is a virus that spreads through contact with infected blood or body fluids. Many people with the virus do not have symptoms.  The virus can lead to serious problems, such as liver  mg by mouth as needed. albuterol (PROVENTIL HFA, VENTOLIN HFA, PROAIR HFA) 90 mcg/actuation inhaler Take 2 Puffs by inhalation every four (4) hours as needed for Wheezing. Activity: No lifting, pushing or pulling anything heavier than 10 lbs x 2 weeks. Walking as tolerated. No driving while you are taking narcotics. Diet: Regular Diet. If you have cramps or nausea, try eating smaller light meals more frequently. You may find it helpful to take an over-the-counter stool softener such as colace if you feel constipated. Wound Care: Keep clean and dry. You may shower, but no immersion in standing water (bath tubs, swimming pools) x 2 weeks. Pat area with soft towel to dry. Follow-up with Dr. Lourdes Middleton in 10-14 days. Call office at [phone] to schedule appointment. We are located at [facility] in [facility].        Signed:  Valery Frank NP  11/14/2022  2:44 PM disease. Some people are at higher risk than others. Your doctor will tell you if you need this shot.     Diabetes Screening:  · A test to measure sugar (glucose) in your blood is called a fasting blood sugar. Fasting means you cannot have food or drink for at least 8 hours before the test. This test can detect diabetes long before you may notice symptoms.    Glaucoma Screening:  · Glaucoma screening is performed by your eye doctor. The test measures the fluid pressure inside your eyes to determine if you have glaucoma.     Hepatitis C Screening:  · A blood test to see if you have the hepatitis C virus.  Hepatitis C attacks the liver and is a major cause of chronic liver disease.  Medicare will cover a single screening for all adults born between 1945 & 1965, or high risk patients (people who have injected illegal drugs or people who have had blood transfusions).  High risk patients who continue to inject illegal drugs can be screened for Hepatitis C every year.    Smoking and Tobacco-Use Cessation Counseling:  · Tobacco is the single greatest cause of disease and early death in our country today. Medication and counseling together can increase a person’s chance of quitting for good.   · Medicare covers two quitting attempts per year, with four counseling sessions per attempt (eight sessions in a 12 month period)    Preventive Screening tests for Women    Screening Mammograms and Breast Exams:  · An x-ray of your breasts to check for breast cancer before you or your doctor may be able to feel it.  If breast cancer is found early it can usually be treated with success.    Pelvic Exams and Pap Tests:  · An exam to check for cervical and vaginal cancer. A Pap test is a lab test in which cells are taken from your cervix and sent to the lab to look for signs of cervical cancer. If cancer of the cervix is found early, chances for a cure are good. Testing can generally end at age 65, or if a woman has a hysterectomy for a  benign condition. Your provider may recommend more frequent testing if certain abnormal results are found.    Bone Mass Measurements:  · A painless x-ray of your bone density to see if you are at risk for a broken bone. Bone density refers to the thickness of bones or how tightly the bone tissue is packed.    Preventive Screening tests for Men    Prostate Screening:  · PSA - Prostate Cancer blood test.  Experts do not recommend routine screening of healthy men with no signs or symptoms of prostate disease.  However, men should not ignore urinary symptoms, and should discuss their family history with their doctor.    *Medicare pays for many preventive services to keep you healthy. For some of these services, you might have to pay a deductible, coinsurance, and / or copayment.  The amounts vary depending on the type of services you need and the kind of Medicare health plan you have.

## 2022-11-14 NOTE — PROGRESS NOTES
Bedside and Verbal shift change report given to Brooke Ruvalcaba RN (oncoming nurse) by Elier Haro LPN (offgoing nurse). Report included the following information SBAR, Kardex, Procedure Summary, Intake/Output, MAR, and Recent Results.

## 2022-11-14 NOTE — PROGRESS NOTES
Progress Note    Patient: Violet Jj MRN: [id number]  SSN: [id number]    YOB: 1960  Age: 63 y.o. Sex: female      Admit Date: 11/10/2022    3 Days Post-Op    Procedure:  Procedure(s):  ROBTIC RECURRENT INCISIONAL HERNIA INCISIONAL HERNIA REPAIR WITH MESH x 3    Subjective:     No acute surgical issues. Pt is resting in bed. Nausea and restlessness improved with compazine. Tolerating diet. Reported incisional pain. Objective:     Visit Vitals  BP (!) 149/69   Pulse (!) 101   Temp 98.2 °F (36.8 °C)   Resp 18   Ht 5' 4\" (1.626 m)   Wt 168 lb (76.2 kg)   SpO2 93%   BMI 28.84 kg/m²       Temp (24hrs), Av.2 °F (36.8 °C), Min:97.8 °F (36.6 °C), Max:98.6 °F (37 °C)      Physical Exam:    Gen:  NAD  Pulm:  Unlabored  Abd:  S/ND/appropriate TTP  Wound:  C/D/I    No results found for this or any previous visit (from the past 24 hour(s)). Assessment:     Hospital Problems  Date Reviewed: 10/6/2022            Codes Class Noted POA    Recurrent incisional hernia ICD-10-CM: K43.2  ICD-9-CM: 553.21  11/10/2022 Unknown         Plan/Recommendations/Medical Decision Making:     - Diet as tolerated  - Pain control  - Out of bed.   Encourage ambulation  - Urinary retention:  Keep wade for now and remove wade tomorrow morning  - Possible DC home Monday

## 2022-11-14 NOTE — PROGRESS NOTES
[facility] at [facility]    POD #4    Subjective     Doing well, just had wade removed, due to void, tolerating diet    Objective     Patient Vitals for the past 24 hrs:   Temp Pulse Resp BP SpO2   11/14/22 0002 99 °F (37.2 °C) 83 16 137/70 97 %   11/13/22 1547 98.2 °F (36.8 °C) (!) 101 18 (!) 149/69 93 %   11/13/22 0836 98.6 °F (37 °C) 97 16 (!) 158/73 95 %   11/13/22 0833 98.4 °F (36.9 °C) 98 20 139/79 93 %       Date 11/13/22 0700 - 11/14/22 0659 11/14/22 0700 - 11/15/22 0659   Shift 5018-7104 2454-5428 24 Hour Total 5845-0424 8602-8521 24 Hour Total   INTAKE   Shift Total(mL/kg)         OUTPUT   Urine(mL/kg/hr) 750(0.8)  750(0.4)        Urine Output (mL) (Urinary Catheter 11/11/22 2- way) 750  750      Shift Total(mL/kg) 750(9.8)  750(9.8)      NET -750  -750      Weight (kg) 76.2 76.2 76.2 76.2 76.2 76.2       PE  Pulm - CTAB  CV - RRR  Abd - soft, ND, BS presnet, incisions c/d/I, mild bruising    Labs  No results found for this or any previous visit (from the past 12 hour(s)). Assessment     Glenda Silva is a 63 y. o.yr old female s/p robotic recurent incisional hernia repairs x3    Plan     Doing well  Due to void  DC home if able to void  If not can DC home with wade and [facility]    Aleyda Bhakta MD'

## 2022-11-14 NOTE — PROGRESS NOTES
Physical Therapy  11/14/2022    Chart reviewed. Attempted to see pt this afternoon, however pt is having bladder scan completed and to get pain meds after. Will check back at later time as able and appropriate.      Danelle Means, PTA

## 2022-11-14 NOTE — PROGRESS NOTES
Pt was able to void. Post void residual was 0 ml. Patient states she is ready to discharge home   Discharge orders placed.  Discussed with patient RN

## 2022-11-14 NOTE — PROGRESS NOTES
RUR: Observation     JUAN: Anticipated discharge home. Patient's  will provide transport home once medically stable. Follow-up with PCP/specialist.     Primary Contact: , Irlanda Stager, [phone]    11:00AM - CM reviewed chart. Per review and ID rounds, patient is POD#4 for robotic recurrent incisional hernia repair. Plan to remove wade with void trial. Discharge pending medical progress and final recommendations. CM will continue to follow as needed.      ISAIAH Deleon   [phone]

## 2022-11-15 ENCOUNTER — TELEPHONE (OUTPATIENT)
Dept: SURGERY | Age: 62
End: 2022-11-15

## 2022-11-15 NOTE — TELEPHONE ENCOUNTER
Returned call to Ms Cheri Pollard verified patient with 2 patient identifiers. Reviewed notes patient s/p robotic incarcerated recurrent incisional hernia repair X - 3  with mesh on 11/10/22. I advised patient per Dr Lashae Christianson she may take the xanax and percocet must separate by 1 hr. Patient verbalized full understanding.

## 2022-11-15 NOTE — TELEPHONE ENCOUNTER
Patient called and stated that she has been taking Xanax for anxiety for the past 25 days because she was anxious about the surgery. She was told she probably shouldn't take Xanax with the Percocet/Oxycodone. She stated that she will need something else to replace it as she is still very anxious, experiencing constant bodily shaking and shortness of breath.

## 2022-11-17 ENCOUNTER — TELEPHONE (OUTPATIENT)
Dept: SURGERY | Age: 62
End: 2022-11-17

## 2022-11-17 NOTE — TELEPHONE ENCOUNTER
Patient is post-op. She called and stated that she had an IV in her elbow area. She stated that yesterday, she noticed a lump about 2 inches below and towards her hand. She stated that it is getting bigger and described it \"overextending like a balloon. \" She stated she can send a picture of it but it doesn't capture well on camera.

## 2022-11-17 NOTE — TELEPHONE ENCOUNTER
Patient called stating she was given access to a [employer] and on it it stated to ask your provider which level to use for treatment. Patient would like to know if this is something she can use to treat her abdominal pain from surgery.

## 2022-11-17 NOTE — TELEPHONE ENCOUNTER
Returned call to Ms Krystin Bartlett verified patient with 2 patient identifiers. I advised patient per [place] not to use the [address] I advised to either use ice pack or heating pad to area. Ms Krystin Bartlett stated she will call her friend who is a Physical Therapist. I again advised to please not use the device because we don't want to cause any trauma to the surgical area.

## 2022-11-17 NOTE — TELEPHONE ENCOUNTER
Returned call to Ms Juju Mensah verified patient with 2 patient identifiers. Reviewed notes patient s/p robotic incarcerated incisional hernia repair on 11/10/22. Patient states she noticed a lump about the size of a half dollar to rt forearm. It is about 2 inches below and towards her hand. She stated that it is getting bigger. She c/o pain 5/10 no tingling or numbness. She stated the extremity is badly bruised. She stated she was stuck several times. She denies any SOB or Chest pain. I advised to apply heating pad keep extremity elevated. I informed Gabi Wing NP she is in agreement with recommendations. Advised to have patient take ibuprofen along with for the pain. I advised patient of recommendations advised to go to the ER or Urgent Care if worsens.

## 2022-11-21 ENCOUNTER — TELEPHONE (OUTPATIENT)
Dept: SURGERY | Age: 62
End: 2022-11-21

## 2022-11-21 DIAGNOSIS — K43.2 RECURRENT INCISIONAL HERNIA: Primary | ICD-10-CM

## 2022-11-21 RX ORDER — OXYCODONE AND ACETAMINOPHEN 5; 325 MG/1; MG/1
1 TABLET ORAL
Qty: 15 TABLET | Refills: 0 | Status: SHIPPED | OUTPATIENT
Start: 2022-11-21 | End: 2022-11-29

## 2022-11-21 NOTE — TELEPHONE ENCOUNTER
Returned call to Ms Tori Davila verified patient with 2 patient identifiers. Reviewed notes patient s/p  robotic incarcerated incisional hernia repair with mesh x3 for three separate hernias on 11/10/22. Patient states she still having lower abdominal pain 7/10. She is taking the percocet alternating with ibuprofen. She is having a hard time sleeping. She is moving her bowels and urinating good. Patient is requesting refill on the pain medication she has 2 tabs left. Post op follow up is scheduled with Светлана Singh NP on 11/29/22 at 11 AM.     I informed patient I will send message to Dr Lourdes Middleton. She stated she can wait until tomorrow has 2 tabs left.

## 2022-11-21 NOTE — TELEPHONE ENCOUNTER
Please call pt back. Pt stated her pain is not manageable at night and is requesting a refill on her pain medication. Pt had surgery 11/10 with Dr Megan Jacques -  Recurrent incisional hernia.

## 2022-11-29 ENCOUNTER — OFFICE VISIT (OUTPATIENT)
Dept: SURGERY | Age: 62
End: 2022-11-29
Payer: COMMERCIAL

## 2022-11-29 VITALS
BODY MASS INDEX: 28.82 KG/M2 | HEART RATE: 80 BPM | DIASTOLIC BLOOD PRESSURE: 84 MMHG | WEIGHT: 168.8 LBS | OXYGEN SATURATION: 93 % | HEIGHT: 64 IN | TEMPERATURE: 97.3 F | SYSTOLIC BLOOD PRESSURE: 133 MMHG

## 2022-11-29 DIAGNOSIS — Z09 POSTOPERATIVE EXAMINATION: Primary | ICD-10-CM

## 2022-11-29 PROCEDURE — 99024 POSTOP FOLLOW-UP VISIT: CPT | Performed by: NURSE PRACTITIONER

## 2022-11-29 RX ORDER — AZELASTINE HYDROCHLORIDE 0.5 MG/ML
SOLUTION/ DROPS OPHTHALMIC
COMMUNITY

## 2022-11-29 RX ORDER — IPRATROPIUM BROMIDE AND ALBUTEROL SULFATE 2.5; .5 MG/3ML; MG/3ML
3 SOLUTION RESPIRATORY (INHALATION)
COMMUNITY
Start: 2022-11-28

## 2022-11-29 RX ORDER — ESCITALOPRAM OXALATE 20 MG/1
20 TABLET ORAL DAILY
COMMUNITY
Start: 2022-11-28

## 2022-11-29 RX ORDER — NALTREXONE HYDROCHLORIDE AND BUPROPION HYDROCHLORIDE 8; 90 MG/1; MG/1
2 TABLET, EXTENDED RELEASE ORAL DAILY
COMMUNITY
Start: 2022-10-05

## 2022-11-29 RX ORDER — IBUPROFEN 800 MG/1
800 TABLET ORAL
Qty: 30 TABLET | Refills: 0 | Status: SHIPPED | OUTPATIENT
Start: 2022-11-29

## 2022-11-29 RX ORDER — POLYETHYLENE GLYCOL 3350 17 G/17G
17 POWDER, FOR SOLUTION ORAL DAILY
Qty: 30 PACKET | Refills: 0 | Status: SHIPPED | OUTPATIENT
Start: 2022-11-29 | End: 2022-12-29

## 2022-11-29 RX ORDER — CYCLOBENZAPRINE HCL 10 MG
5-10 TABLET ORAL
Qty: 10 TABLET | Refills: 0 | Status: SHIPPED | OUTPATIENT
Start: 2022-11-29

## 2022-11-29 RX ORDER — VALACYCLOVIR HYDROCHLORIDE 1 G/1
1000 TABLET, FILM COATED ORAL AS NEEDED
COMMUNITY
Start: 2022-11-28

## 2022-11-29 NOTE — PROGRESS NOTES
Identified pt with two pt identifiers (name and ). Reviewed chart in preparation for visit and have obtained necessary documentation. Robert Calhoun is a 63 y.o. female  Chief Complaint   Patient presents with    Surgical Follow-up     2 weeks postop robotic incarcerated incisional hernia repair with mesh x3 for three separate hernias. Visit Vitals  /84 (BP 1 Location: Right upper arm, BP Patient Position: Sitting, BP Cuff Size: Adult)   Pulse 80   Temp 97.3 °F (36.3 °C) (Oral)   Ht 5' 4\" (1.626 m)   Wt 168 lb 12.8 oz (76.6 kg)   SpO2 93%   BMI 28.97 kg/m²       1. Have you been to the ER, urgent care clinic since your last visit? Hospitalized since your last visit? No    2. Have you seen or consulted any other health care providers outside of the [address] [place] [address] since your last visit? Include any pap smears or colon screening.  No

## 2022-11-29 NOTE — PROGRESS NOTES
Subjective:      Shabnam Cortes is a 63 y.o. female presents for postop care 2 weeks status post ROBTIC RECURRENT INCISIONAL HERNIA INCISIONAL HERNIA REPAIR WITH MESH x 3    Appetite is fair. Eating a regular diet without difficulty. Bowel movements are constipated. She is taking stool softener 3 times a day. She states that she is having discomfort in her lower abdomen. She has taken all of the ibuprofen 800 mg with Dr. Lala Baxter prescribed. He is currently taking half a Percocet at night to help her sleep due to the discomfort. She states that her pain is during getting in and out of the car or getting out of bed. Denies nausea or vomiting    Ms. Kenroy Miranda has a reminder for a \"due or due soon\" health maintenance. I have asked that she contact her primary care provider for follow-up on this health maintenance. Objective:     Visit Vitals  /84 (BP 1 Location: Right upper arm, BP Patient Position: Sitting, BP Cuff Size: Adult)   Pulse 80   Temp 97.3 °F (36.3 °C) (Oral)   Ht 5' 4\" (1.626 m)   Wt 168 lb 12.8 oz (76.6 kg)   SpO2 93%   BMI 28.97 kg/m²       General:  alert, cooperative   Abdomen: soft, bowel sounds active, tenderness lower abdomen. Incision:   healing well, no drainage, no erythema, no hernia, no seroma, no swelling, no dehiscence, incision well approximated     Assessment:     Doing well postoperatively. Postop discomfort. Musculoskeletal pain. Plan: Will refill 800 mg ibuprofen to help with discomfort prescribed Flexeril 5 to 10 mg 3 times a day as needed for musculoskeletal spasm. MiraLAX prescribed for constipation. She may continue taking her stool softener. Follow-up as needed. .  No lifting greater than 10 pounds for 2 more weeks.

## 2022-12-05 ENCOUNTER — TELEPHONE (OUTPATIENT)
Dept: SURGERY | Age: 62
End: 2022-12-05

## 2022-12-05 NOTE — TELEPHONE ENCOUNTER
Patient called wanting to know if we will prescribe a medication for bladder pain. Stated at her last office visit she dicussed having these pains when having any kind of surgery. Patient specifically requesting Phenavopyridine.

## 2022-12-05 NOTE — TELEPHONE ENCOUNTER
Returned call to patient. Two patient identifiers used. Patient stated she is having bladder pain and stated she spoke with the provider concerning her pain at her appt on 11/29/2022. Patient would like to have a prescription for Phenazopyridine. Informed patient provider is not in the office but I'll make provider aware. Patient verbalized understanding.

## 2022-12-06 RX ORDER — CYCLOBENZAPRINE HCL 10 MG
5-10 TABLET ORAL
Qty: 10 TABLET | Refills: 0 | Status: SHIPPED | OUTPATIENT
Start: 2022-12-06

## 2022-12-06 RX ORDER — CYCLOBENZAPRINE HCL 10 MG
5-10 TABLET ORAL
Qty: 10 TABLET | Refills: 0 | Status: CANCELLED | OUTPATIENT
Start: 2022-12-06

## 2022-12-06 NOTE — TELEPHONE ENCOUNTER
Occupational Therapy Discharge Summary    Reason for therapy discharge:    Discharged to acute rehabilitation facility.    Progress towards therapy goal(s). See goals on Care Plan in [facility] electronic health record for goal details.  Goals partially met.  Barriers to achieving goals:   discharge from facility.    Therapy recommendation(s):    Continued therapy is recommended.  Rationale/Recommendations:  To maximize safety and IND with ADLs.       Patient would like a refill.

## 2022-12-06 NOTE — TELEPHONE ENCOUNTER
Returned call to patient. Two patient identifiers used. Informed patient per Miriam Martin NP patient would need to follow up with PCP. Patient verbalized understanding.

## 2022-12-06 NOTE — TELEPHONE ENCOUNTER
Returned call to patient. Two patient identifiers used. Patient called back and stated she would like a refill on her muscle relaxer. Informed patient i'll make the provider aware. Patient verbalized understanding.

## 2022-12-16 ENCOUNTER — TELEPHONE (OUTPATIENT)
Dept: SURGERY | Age: 62
End: 2022-12-16

## 2022-12-16 ENCOUNTER — PATIENT MESSAGE (OUTPATIENT)
Dept: SURGERY | Age: 62
End: 2022-12-16

## 2022-12-16 NOTE — TELEPHONE ENCOUNTER
Patient called stating that her incision is filling up with pus every 3 days and she thinks it may be infected. She stated that she will not have time to come into the office before [place] and was hoping to have an antibiotic called into the pharmacy.

## 2022-12-16 NOTE — TELEPHONE ENCOUNTER
Returned call to Ms Juju Mensah verified patient with 2 patient identifiers. Reviewed notes patient is s/p  Robotic incarcerated incisional hernia repair with mesh x3 for three separate hernias on 11/10/22. Patient was seen by Gabi Wing NP on 11/29/22     Patient states the incision above the right breast gets red inflamed every 2 days. It is warm to the touch. Ms Juju Mensah is requesting a ABT. Ms Juju Mensah stated she can't get into the office next week. I asked if patient can upload a picture to [employer]. Dr Dawn Miller was in the office made aware. He will evaluate this evening. Message forwarded to Dr Dawn Miller. Dr Dawn Miller replied to patient via [employer].

## 2022-12-30 ENCOUNTER — OFFICE VISIT (OUTPATIENT)
Dept: SURGERY | Age: 62
End: 2022-12-30
Payer: COMMERCIAL

## 2022-12-30 VITALS
WEIGHT: 168.5 LBS | BODY MASS INDEX: 28.77 KG/M2 | HEIGHT: 64 IN | HEART RATE: 82 BPM | TEMPERATURE: 98.7 F | RESPIRATION RATE: 16 BRPM | SYSTOLIC BLOOD PRESSURE: 130 MMHG | OXYGEN SATURATION: 98 % | DIASTOLIC BLOOD PRESSURE: 83 MMHG

## 2022-12-30 DIAGNOSIS — M54.50 ACUTE MIDLINE LOW BACK PAIN WITHOUT SCIATICA: ICD-10-CM

## 2022-12-30 DIAGNOSIS — K59.01 SLOW TRANSIT CONSTIPATION: ICD-10-CM

## 2022-12-30 DIAGNOSIS — R14.0 ABDOMINAL BLOATING: ICD-10-CM

## 2022-12-30 DIAGNOSIS — R10.30 LOWER ABDOMINAL PAIN: ICD-10-CM

## 2022-12-30 DIAGNOSIS — Z09 POSTOPERATIVE EXAMINATION: Primary | ICD-10-CM

## 2022-12-30 PROCEDURE — 99024 POSTOP FOLLOW-UP VISIT: CPT | Performed by: NURSE PRACTITIONER

## 2022-12-30 RX ORDER — POLYETHYLENE GLYCOL 3350 17 G/17G
17 POWDER, FOR SOLUTION ORAL 2 TIMES DAILY
Qty: 60 EACH | Refills: 3 | Status: SHIPPED | OUTPATIENT
Start: 2022-12-30

## 2022-12-30 RX ORDER — CYCLOBENZAPRINE HCL 10 MG
5-10 TABLET ORAL
Qty: 15 TABLET | Refills: 0 | Status: SHIPPED | OUTPATIENT
Start: 2022-12-30

## 2022-12-30 NOTE — PATIENT INSTRUCTIONS
Increase the Miralax to TWICE daily     Add Phazyme or Gas x as needed     Try switching to Lactose free milk or [employer] ultra pasturized milk to minimize bloating     Abdominal support, like spandex high wasted pants ort shape wear    Switch to Tylenol 2 tabs every 8 hours as needed (vs the Ibuprofen) and ok to add the Flexeril 1/2 - 1 tab every 8 hours if needed     Plan on follow up in about 1 week to assess if any improvement     Gentle stretching is helpful and walking (with good posture)     Hot tea can help stimulate the gut - try ginger lemon tea or mint

## 2022-12-30 NOTE — PROGRESS NOTES
Chief Complaint   Patient presents with    Post OP Follow Up     7 weeks post  Robotic incarcerated incisional hernia repair with mesh x3 for three separate hernias. Abdominal Pain     Lower abd pain  Blood in stool     Gwyn Elmore 7 weeks s/p Robotic incarcerated incisional hernia repair with mesh x3 for three separate hernias. Presents today with complaints of lower abdominal pain, bloating and some bright red blood streaking on the toilet tissue. Her bowels are slow and she does have to strain some to have a bowel movement. She said she does not pass much gas. She does get relief from the pain after she has a bowel movement. She has no associated nausea or vomiting  No associated fever, chills, chest pain or shortness of breath  Sometimes she reports \"I feel a twinge and a pulling on the left side when I stretch or reach\". Lower abdominal pain does radiate to her back and she is complaining of some low back soreness. She denies alcohol. She said she does drink a lot of 1% milk and it is her go to beverage  She is been taking MiraLAX once a day but still has to strain  She is able to wear the abdominal binder about 3 hours a day and it becomes uncomfortable  She try to walk some for activity  She is been taking some ibuprofen but she is out of the prescription  Pain is not exacerbated by eating or drinking    Visit Vitals  /83 (BP 1 Location: Right arm, BP Patient Position: Sitting, BP Cuff Size: Large adult)   Pulse 82   Temp 98.7 °F (37.1 °C) (Oral)   Resp 16   Ht 5' 4\" (1.626 m)   Wt 168 lb 8 oz (76.4 kg)   SpO2 98%   BMI 28.92 kg/m²     A + O x 3  Chest  CTA  COR  RRR  ABD Soft, lap sites are clean, dry and intact without erythema or induration, well-healed, some vague tenderness at the previous colostomy site, no increased pain with Valsalva, not able to appreciate any hernia recurrence, /ND, +BS, no masses or hernias.   EXT No edema; ambulating independently    ICD-10-CM ICD-9-CM 1. Postoperative examination  Z09 V67.00       2. Lower abdominal pain  R10.30 789.09       3. Abdominal bloating  R14.0 787.3       4. Slow transit constipation  K59.01 564.01 polyethylene glycol (MIRALAX) 17 gram packet      5. Acute midline low back pain without sciatica  M54.50 724.2 cyclobenzaprine (FLEXERIL) 10 mg tablet        7-week status post robotic recurrent incisional hernia repair with mesh and she had multiple small hernias  Think some of this pain is related to constipation and have asked her to increase the MiraLAX to twice a day  I suggested she switch to a lactose-free milk or the fair life product which is ultra pasteurized and seems to cause less GI upset, bloating and gas  Gentle stretching and abdominal support such as shape wear which may be more comfortable than the binder  Flexeril 10 mg half a tab to 1 tab every 8 hours as needed for back pain and she was advised to switch to some X strength Tylenol 2 tabs every 8 hours as she has had 2 rounds of 800 mg ibuprofen and that may be causing a little bit of GI upset  Daily walking with good posture  Vascular follow-up in 1 week and if no improvement consider abdominal ultrasound to evaluate for hernia recurrence  She said she may be out of town next week and she asked if the week after was okay when she it is sufficient. I will make a note to reach out to her and see how she is doing. Paige Bills verbalized understanding and questions were answered to the best of my knowledge and ability. Bowel regimen educational materials were provided.

## 2022-12-30 NOTE — PROGRESS NOTES
Identified pt with two pt identifiers (name and ). Reviewed chart in preparation for visit and have obtained necessary documentation. Leonides Cervantes is a 63 y.o. female  Chief Complaint   Patient presents with    Post OP Follow Up     7 weeks post  Robotic incarcerated incisional hernia repair with mesh x3 for three separate hernias. Abdominal Pain     Lower abd pain  Blood in stool     Visit Vitals  /83 (BP 1 Location: Right arm, BP Patient Position: Sitting, BP Cuff Size: Large adult)   Pulse 82   Temp 98.7 °F (37.1 °C) (Oral)   Resp 16   Ht 5' 4\" (1.626 m)   Wt 168 lb 8 oz (76.4 kg)   SpO2 98%   BMI 28.92 kg/m²       1. Have you been to the ER, urgent care clinic since your last visit? Hospitalized since your last visit? No    2. Have you seen or consulted any other health care providers outside of the [address] [place] [address] since your last visit? Include any pap smears or colon screening.  No

## 2023-01-05 ENCOUNTER — PATIENT MESSAGE (OUTPATIENT)
Dept: SURGERY | Age: 63
End: 2023-01-05

## 2023-02-06 ENCOUNTER — TELEPHONE (OUTPATIENT)
Dept: SURGERY | Age: 63
End: 2023-02-06

## 2023-02-06 NOTE — TELEPHONE ENCOUNTER
Patient called stating that she had the stomach bug last week and after having 4 days of vomiting and diarrhea she has the feeling again of something being pulled like when she had hernia surgery. Patient would like a refill of muscle relaxers to help with the pain. Other Other Other

## 2023-02-06 NOTE — TELEPHONE ENCOUNTER
Patient identified with two patient identifiers. Patient informed I discussed symptoms with provider and she will not refill flexeril previously order. Patient informed office follow up is needed if symptom is continual.  Patient questioning if she has copay patient informed I will transfer her to [facility] to discuss. Patient expressed understanding.

## 2023-02-09 ENCOUNTER — OFFICE VISIT (OUTPATIENT)
Dept: SURGERY | Age: 63
End: 2023-02-09
Payer: COMMERCIAL

## 2023-02-09 VITALS
WEIGHT: 166.6 LBS | TEMPERATURE: 98.5 F | OXYGEN SATURATION: 95 % | DIASTOLIC BLOOD PRESSURE: 81 MMHG | SYSTOLIC BLOOD PRESSURE: 130 MMHG | HEIGHT: 64 IN | HEART RATE: 75 BPM | BODY MASS INDEX: 28.44 KG/M2

## 2023-02-09 DIAGNOSIS — Z09 POSTOPERATIVE EXAMINATION: Primary | ICD-10-CM

## 2023-02-09 DIAGNOSIS — R10.84 GENERALIZED ABDOMINAL PAIN: ICD-10-CM

## 2023-02-09 PROCEDURE — 99024 POSTOP FOLLOW-UP VISIT: CPT | Performed by: NURSE PRACTITIONER

## 2023-02-09 NOTE — PROGRESS NOTES
Identified pt with two pt identifiers (name and ). Reviewed chart in preparation for visit and have obtained necessary documentation. Magy Beth is a 63 y.o. female  Chief Complaint   Patient presents with    Post OP Follow Up     3 mths s/p cristobal incarcerated incisional hernia repair with mesh x3 for three separate hernias. Abdominal Pain     Visit Vitals  /81 (BP 1 Location: Right upper arm, BP Patient Position: Sitting, BP Cuff Size: Large adult)   Pulse 75   Temp 98.5 °F (36.9 °C) (Oral)   Ht 5' 4\" (1.626 m)   Wt 166 lb 9.6 oz (75.6 kg)   SpO2 95%   BMI 28.60 kg/m²         1. Have you been to the ER, urgent care clinic since your last visit? Hospitalized since your last visit? No    2. Have you seen or consulted any other health care providers outside of the [address] [place] [address] since your last visit? Include any pap smears or colon screening.  No

## 2023-02-09 NOTE — PROGRESS NOTES
Subjective:      Edelmira Srivastava is a 63 y.o. female presents for postop care 3 months following robotic recurrent incision hernia with mesh x 3. She states that she is concerned that she has another hernia since having a violent stomach bug a week ago. She states that her stomach is sore and she feels it in her back. She is taking miralax. Ms. Razia Prasad has a reminder for a \"due or due soon\" health maintenance. I have asked that she contact her primary care provider for follow-up on this health maintenance. Objective:     Visit Vitals  /81 (BP 1 Location: Right upper arm, BP Patient Position: Sitting, BP Cuff Size: Large adult)   Pulse 75   Temp 98.5 °F (36.9 °C) (Oral)   Ht 5' 4\" (1.626 m)   Wt 166 lb 9.6 oz (75.6 kg)   SpO2 95%   BMI 28.60 kg/m²       General:  alert, cooperative   Abdomen: Soft, slightly tender on the left side near old colostomy site. Incision:   healing well, no drainage, no erythema, no dehiscence, incision well approximated, no hernias noted   Dr. Alba Sanz into evaluate patient as well. Assessment:     Doing well postoperatively. Soreness from stomach bug  Plan:     Rotate Tylenol and motrin to help with discomfort. Use heating pad. If pain continue in a month, follow up for additional imaging. Follow up as needed.    Alexia Wilson NP

## 2023-07-05 PROBLEM — J44.9 CHRONIC OBSTRUCTIVE PULMONARY DISEASE (HCC): Status: ACTIVE | Noted: 2023-07-05

## 2023-07-05 PROBLEM — F32.A DEPRESSION: Status: ACTIVE | Noted: 2023-07-05

## 2023-07-05 PROBLEM — I27.21 PAH (PULMONARY ARTERY HYPERTENSION) (HCC): Status: ACTIVE | Noted: 2023-07-05

## 2023-12-17 PROBLEM — J44.1 COPD EXACERBATION (HCC): Status: ACTIVE | Noted: 2023-12-17

## 2024-07-12 RX ORDER — MELOXICAM 15 MG/1
15 TABLET ORAL DAILY
Qty: 30 TABLET | Refills: 0 | Status: SHIPPED | OUTPATIENT
Start: 2024-07-12

## 2024-07-12 NOTE — TELEPHONE ENCOUNTER
Spoke with patient regarding needing a refill on Meloxicam and let her know that I would forward the rx to Dr. Arana and he would get to it today or early Monday.

## 2024-09-25 ENCOUNTER — HOSPITAL ENCOUNTER (OUTPATIENT)
Facility: HOSPITAL | Age: 64
Discharge: HOME | End: 2024-09-28
Payer: MEDICARE

## 2024-09-25 DIAGNOSIS — M51.36 DDD (DEGENERATIVE DISC DISEASE), LUMBAR: ICD-10-CM

## 2024-09-25 DIAGNOSIS — M54.42 ACUTE BILATERAL LOW BACK PAIN WITH BILATERAL SCIATICA: ICD-10-CM

## 2024-09-25 DIAGNOSIS — M54.41 ACUTE BILATERAL LOW BACK PAIN WITH BILATERAL SCIATICA: ICD-10-CM

## 2024-09-25 DIAGNOSIS — M43.17 SPONDYLOLISTHESIS OF LUMBOSACRAL REGION: ICD-10-CM

## 2024-09-25 PROCEDURE — 72148 MRI LUMBAR SPINE W/O DYE: CPT

## 2025-01-14 ENCOUNTER — HOSPITAL ENCOUNTER (OUTPATIENT)
Facility: HOSPITAL | Age: 65
Discharge: HOME | End: 2025-01-17
Payer: MEDICARE

## 2025-01-14 DIAGNOSIS — M19.011 PRIMARY OSTEOARTHRITIS, RIGHT SHOULDER: ICD-10-CM

## 2025-01-14 PROCEDURE — 73200 CT UPPER EXTREMITY W/O DYE: CPT

## 2025-02-06 NOTE — PERIOP NOTE
1020 PC to patient to schedule PAT prior to surgery 2/25/25 with Dr. Arana. Patient offered appt on 2/12/25. Patient requests PAT 2/14/25.    1340 Rec PC from patient to r/s PAT to 2/17. Patient advised that if she waits too long to schedule PAT that any abnormal results could delay surgery date. Patient verbalized understanding.

## 2025-02-17 ENCOUNTER — HOSPITAL ENCOUNTER (OUTPATIENT)
Facility: HOSPITAL | Age: 65
Discharge: HOME | End: 2025-02-20
Payer: MEDICARE

## 2025-02-17 VITALS
WEIGHT: 182.1 LBS | SYSTOLIC BLOOD PRESSURE: 153 MMHG | BODY MASS INDEX: 33.51 KG/M2 | OXYGEN SATURATION: 96 % | RESPIRATION RATE: 24 BRPM | HEART RATE: 94 BPM | TEMPERATURE: 97.9 F | DIASTOLIC BLOOD PRESSURE: 83 MMHG | HEIGHT: 62 IN

## 2025-02-17 LAB
ABO + RH BLD: NORMAL
ALBUMIN SERPL-MCNC: 3.8 G/DL (ref 3.5–5)
ALBUMIN/GLOB SERPL: 1.4 (ref 1.1–2.2)
ALP SERPL-CCNC: 108 U/L (ref 45–117)
ALT SERPL-CCNC: 20 U/L (ref 12–78)
ANION GAP SERPL CALC-SCNC: 5 MMOL/L (ref 2–12)
APPEARANCE UR: CLEAR
AST SERPL-CCNC: 11 U/L (ref 15–37)
BACTERIA URNS QL MICRO: NEGATIVE /HPF
BILIRUB SERPL-MCNC: 0.5 MG/DL (ref 0.2–1)
BILIRUB UR QL: NEGATIVE
BLOOD GROUP ANTIBODIES SERPL: NORMAL
BUN SERPL-MCNC: 12 MG/DL (ref 6–20)
BUN/CREAT SERPL: 21 (ref 12–20)
CALCIUM SERPL-MCNC: 8.9 MG/DL (ref 8.5–10.1)
CHLORIDE SERPL-SCNC: 102 MMOL/L (ref 97–108)
CO2 SERPL-SCNC: 29 MMOL/L (ref 21–32)
COLOR UR: ABNORMAL
CREAT SERPL-MCNC: 0.56 MG/DL (ref 0.55–1.02)
EPITH CASTS URNS QL MICRO: ABNORMAL /LPF
ERYTHROCYTE [DISTWIDTH] IN BLOOD BY AUTOMATED COUNT: 12.9 % (ref 11.5–14.5)
EST. AVERAGE GLUCOSE BLD GHB EST-MCNC: 97 MG/DL
GLOBULIN SER CALC-MCNC: 2.8 G/DL (ref 2–4)
GLUCOSE SERPL-MCNC: 87 MG/DL (ref 65–100)
GLUCOSE UR STRIP.AUTO-MCNC: NEGATIVE MG/DL
HBA1C MFR BLD: 5 % (ref 4–5.6)
HCT VFR BLD AUTO: 41.2 % (ref 35–47)
HGB BLD-MCNC: 14.1 G/DL (ref 11.5–16)
HGB UR QL STRIP: NEGATIVE
HYALINE CASTS URNS QL MICRO: ABNORMAL /LPF (ref 0–2)
INR PPP: 1 (ref 0.9–1.1)
KETONES UR QL STRIP.AUTO: NEGATIVE MG/DL
LEUKOCYTE ESTERASE UR QL STRIP.AUTO: ABNORMAL
MCH RBC QN AUTO: 33.1 PG (ref 26–34)
MCHC RBC AUTO-ENTMCNC: 34.2 G/DL (ref 30–36.5)
MCV RBC AUTO: 96.7 FL (ref 80–99)
NITRITE UR QL STRIP.AUTO: NEGATIVE
NRBC # BLD: 0 K/UL (ref 0–0.01)
NRBC BLD-RTO: 0 PER 100 WBC
PH UR STRIP: 8 (ref 5–8)
PLATELET # BLD AUTO: 268 K/UL (ref 150–400)
PMV BLD AUTO: 9 FL (ref 8.9–12.9)
POTASSIUM SERPL-SCNC: 4 MMOL/L (ref 3.5–5.1)
PROT SERPL-MCNC: 6.6 G/DL (ref 6.4–8.2)
PROT UR STRIP-MCNC: NEGATIVE MG/DL
PROTHROMBIN TIME: 10.5 SEC (ref 9.2–11.2)
RBC # BLD AUTO: 4.26 M/UL (ref 3.8–5.2)
RBC #/AREA URNS HPF: ABNORMAL /HPF (ref 0–5)
SODIUM SERPL-SCNC: 136 MMOL/L (ref 136–145)
SP GR UR REFRACTOMETRY: 1.01
SPECIMEN EXP DATE BLD: NORMAL
URINE CULTURE IF INDICATED: ABNORMAL
UROBILINOGEN UR QL STRIP.AUTO: 0.2 EU/DL (ref 0.2–1)
WBC # BLD AUTO: 7.1 K/UL (ref 3.6–11)
WBC URNS QL MICRO: ABNORMAL /HPF (ref 0–4)

## 2025-02-17 PROCEDURE — 86900 BLOOD TYPING SEROLOGIC ABO: CPT

## 2025-02-17 PROCEDURE — 85027 COMPLETE CBC AUTOMATED: CPT

## 2025-02-17 PROCEDURE — 85610 PROTHROMBIN TIME: CPT

## 2025-02-17 PROCEDURE — 83036 HEMOGLOBIN GLYCOSYLATED A1C: CPT

## 2025-02-17 PROCEDURE — 97165 OT EVAL LOW COMPLEX 30 MIN: CPT

## 2025-02-17 PROCEDURE — 36415 COLL VENOUS BLD VENIPUNCTURE: CPT

## 2025-02-17 PROCEDURE — 97110 THERAPEUTIC EXERCISES: CPT

## 2025-02-17 PROCEDURE — 80053 COMPREHEN METABOLIC PANEL: CPT

## 2025-02-17 PROCEDURE — 93005 ELECTROCARDIOGRAM TRACING: CPT | Performed by: ORTHOPAEDIC SURGERY

## 2025-02-17 PROCEDURE — 86850 RBC ANTIBODY SCREEN: CPT

## 2025-02-17 PROCEDURE — 81001 URINALYSIS AUTO W/SCOPE: CPT

## 2025-02-17 PROCEDURE — 86901 BLOOD TYPING SEROLOGIC RH(D): CPT

## 2025-02-17 RX ORDER — FLUTICASONE FUROATE, UMECLIDINIUM BROMIDE AND VILANTEROL TRIFENATATE 100; 62.5; 25 UG/1; UG/1; UG/1
1 POWDER RESPIRATORY (INHALATION) DAILY
COMMUNITY

## 2025-02-17 RX ORDER — ACETAMINOPHEN 500 MG
500 TABLET ORAL EVERY 6 HOURS PRN
COMMUNITY

## 2025-02-17 ASSESSMENT — PAIN DESCRIPTION - LOCATION: LOCATION: SHOULDER

## 2025-02-17 ASSESSMENT — PAIN SCALES - GENERAL: PAINLEVEL_OUTOF10: 3

## 2025-02-17 ASSESSMENT — PAIN DESCRIPTION - DESCRIPTORS: DESCRIPTORS: ACHING

## 2025-02-17 ASSESSMENT — PAIN DESCRIPTION - ORIENTATION: ORIENTATION: RIGHT

## 2025-02-17 NOTE — PROGRESS NOTES
[facility]  Ambulatory Surgery Unit  Joint/Spine Preoperative Instructions        Surgery Date 2/25/25          Tentative Arrival Time to be called    1. On the day of your surgery, please report to the Ambulatory Surgery Unit Registration Desk and sign in at your designated time. The Ambulatory Surgery Unit is located in [facility] on the [facility] side of the [facility], across from the [facility].  Pleases have all of your insurance cards, photo ID and copay with you the morning of surgery.    2. You must have someone with you to drive you home. You should not drive a car for 24 hours following surgery. Please make arrangements for a friend or family member to stay with you at least the first 24 hours after your surgery.    3. No food after midnight 2/24/25.  This includes gum, candy, or mints.  Medications morning of surgery should be taken with a sip of water.  Please follow pre-surgery drink instructions that were given at your Pre Admission Testing appointment.      4. We recommend you do not drink any alcoholic beverages for 24 hours before and after your surgery.    5. Contact your surgeon’s office for instructions on the following medications: non-steroidal anti-inflammatory drugs (i.e. Advil, Aleve), vitamins, and supplements. (Some surgeon’s will want you to stop these medications prior to surgery and others may allow you to take them)  **If you are currently taking Plavix, Coumadin, Aspirin and/or other blood-thinning agents, contact your surgeon for instructions.** Your surgeon will partner with the physician prescribing these medications to determine if it is safe to stop or if you need to continue taking.  Please do not stop taking these medications without instructions from your surgeon    6. Wear comfortable clothes.  Wear glasses instead of contacts.  Do not bring any money or jewelry. Please bring picture ID, insurance card, and any prearranged

## 2025-02-17 NOTE — PROGRESS NOTES
[facility]  Occupational Therapy Pre-surgery evaluation  [address] [place] [address]    OCCUPATIONAL THERAPY PRE TOTAL SHOULDER SURGERY EVALUATION  Date: 2025  Patient: Marty Villegas (64 y.o. 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     Discussed anticipated disposition to home with possible discharge within a 1 to 2 day time frame post-surgery. Patient's  was not present for the session.  Patient in agreement. Pt stated that she has been practicing using her LUE for some time due to pain in right UE.  She will be staying with her brother and his wife after surgery, until she is cleared to return home and able to drive.    Anticipate patient will not need acute OT orders based on no deficits post surgery.     GOALS: (Goals have been discussed and agreed upon with patient.)  DISCHARGE GOALS: Time Frame: 1 DAY  Patient will demonstrate and/or verbalize full understanding of instructions related compensatory UB ADL techniques, sling management, shoulder/UE positioning for pain control, post operative shoulder exercises and functional mobility in order to minimize functional deficits in preparation for their upcoming surgery. This will be achieved by using education, demonstration and through the use of an informational handout including a home exercise program.    REHABILITATION POTENTIAL FOR STATED GOALS:  Good       RECOMMENDATIONS AND PLANNED

## 2025-02-17 NOTE — PROGRESS NOTES

## 2025-02-17 NOTE — PROGRESS NOTES

## 2025-02-18 LAB
BACTERIA SPEC CULT: NORMAL
BACTERIA SPEC CULT: NORMAL
EKG ATRIAL RATE: 84 BPM
EKG DIAGNOSIS: NORMAL
EKG P AXIS: 71 DEGREES
EKG P-R INTERVAL: 164 MS
EKG Q-T INTERVAL: 366 MS
EKG QRS DURATION: 74 MS
EKG QTC CALCULATION (BAZETT): 432 MS
EKG R AXIS: 46 DEGREES
EKG T AXIS: 41 DEGREES
EKG VENTRICULAR RATE: 84 BPM
SERVICE CMNT-IMP: NORMAL

## 2025-02-19 NOTE — PROGRESS NOTES
I spoke with the patient and she said she was not told that she needed to see her PCP. Called Dr Arana's office.  LVM with his assistant to verify if pulmonary clearance would be acceptable for surgery.   Requested call back.

## 2025-02-20 ENCOUNTER — ANESTHESIA EVENT (OUTPATIENT)
Facility: HOSPITAL | Age: 65
End: 2025-02-20
Payer: MEDICARE

## 2025-02-20 NOTE — PERIOP NOTE
Spoke with Karma in Dr. Arana's office.  Pt to be moved to main OR per Dr. Cunningham.  Per Karma, medical clearance not required but pulmonary clearance will need to be reviewed with Dr. Arana.  Karma to call PAT back with any further concerns.

## 2025-02-20 NOTE — PERIOP NOTE
Dr Cunningham with anesthesia reviewed chart and pulmonary notes and stated that patient needs to be moved to MAIN OR    Called Karma at Dr Porter office, LVM for Karma at Dr Porter office making aware that patient will need to be moved to MAIN OR per Anesthesia and ASU PAT will fax all information we have over to MAIN PAT

## 2025-02-21 RX ORDER — CEFAZOLIN SODIUM/WATER 2 G/20 ML
2000 SYRINGE (ML) INTRAVENOUS ONCE
OUTPATIENT
Start: 2025-02-25

## 2025-02-21 RX ORDER — SODIUM CHLORIDE, SODIUM LACTATE, POTASSIUM CHLORIDE, CALCIUM CHLORIDE 600; 310; 30; 20 MG/100ML; MG/100ML; MG/100ML; MG/100ML
INJECTION, SOLUTION INTRAVENOUS CONTINUOUS
OUTPATIENT
Start: 2025-02-25

## 2025-02-21 NOTE — PROGRESS NOTES
Called Dr Arana's office and spoke to AUGUSTINE Mcmillan regarding patient. Per AUGUSTINE Mcmillan, patient does not need PCP clearance as long as she received pulmonary clearance. Dr Arana is aware that pulmonary states she is at \"high risk\" and that is why patient was moved to the Main OR.

## 2025-02-24 NOTE — DISCHARGE INSTRUCTIONS
Shoulder Replacement:  Postoperative Instructions  Dr. Best Arana      Exparel is a numbing medication used in your nerve block to help with post-operative pain control. Expect the effects of numbing to last for approximately 72 hours (3 days).  You may have control of arm in 24 hours.     You will be wearing an Exparel wristband to alert the Emergency Department and/or hospital that you have received this medication if need to be seen. Do not remove the wristband until you have full feeling in area.     When you start to have discomfort in your surgical arm, begin Tylenol and take according to your discharge instructions.     When you start to feel PAIN, begin to take the Oxycodone WITH FOOD and take according to your discharge instructions.     Pain control:  Typically, we will prescribe a narcotic.  Usually 1-2 tabs every four hours is sufficient for the pain. Most patients need this only for the first few weeks. You should discontinue this as the pain decreases. Most of the time the pain medication prescribed is Oxycodone WITHOUT acetaminophen/tylenol.  If no allergies or contraindications, it is beneficial to take tylenol 1000mg every 8 hours as a baseline pain control.  Then the pain medication is taken as needed.  You can also take an anti-inflammatory like motrin/ibuprofen/aleve.    You should not drive while taking any narcotic pain medications.    No pain medications refills can be provided after 3pm on Fridays or over the weekend.    Constipation:  Pain medicines and anesthesia can be constipating-this can be prevented by gentle physical activity and drinking plenty of fluid. It should be treated with over-the-counter medications such as Miralax or suppositories, and/or Fleets enema. You should have a bowel movement at least every other day following surgery.    Incision care:  Keep this area clean and dry. DO NOT rub the incision. DO NOT take a tub bath or go swimming until cleared by your doctor.

## 2025-02-25 ENCOUNTER — ANESTHESIA (OUTPATIENT)
Facility: HOSPITAL | Age: 65
End: 2025-02-25
Payer: MEDICARE

## 2025-02-25 ENCOUNTER — HOSPITAL ENCOUNTER (OUTPATIENT)
Facility: HOSPITAL | Age: 65
Setting detail: OBSERVATION
Discharge: HOME | End: 2025-02-27
Attending: ORTHOPAEDIC SURGERY | Admitting: ORTHOPAEDIC SURGERY
Payer: MEDICARE

## 2025-02-25 PROBLEM — M19.011 OSTEOARTHRITIS OF RIGHT SHOULDER, UNSPECIFIED OSTEOARTHRITIS TYPE: Status: ACTIVE | Noted: 2025-02-25

## 2025-02-25 PROBLEM — M75.101 ROTATOR CUFF TEAR ARTHROPATHY OF RIGHT SHOULDER: Status: ACTIVE | Noted: 2025-02-25

## 2025-02-25 PROBLEM — M12.811 ROTATOR CUFF TEAR ARTHROPATHY OF RIGHT SHOULDER: Status: ACTIVE | Noted: 2025-02-25

## 2025-02-25 PROBLEM — M19.011 OSTEOARTHRITIS, LOCALIZED, SHOULDER, RIGHT: Status: ACTIVE | Noted: 2025-02-25

## 2025-02-25 LAB
GLUCOSE BLD STRIP.AUTO-MCNC: 126 MG/DL (ref 65–117)
SERVICE CMNT-IMP: ABNORMAL

## 2025-02-25 PROCEDURE — 3600000005 HC SURGERY LEVEL 5 BASE: Performed by: ORTHOPAEDIC SURGERY

## 2025-02-25 PROCEDURE — 7100000001 HC PACU RECOVERY - ADDTL 15 MIN: Performed by: ORTHOPAEDIC SURGERY

## 2025-02-25 PROCEDURE — 82962 GLUCOSE BLOOD TEST: CPT

## 2025-02-25 PROCEDURE — 2500000003 HC RX 250 WO HCPCS: Performed by: ORTHOPAEDIC SURGERY

## 2025-02-25 PROCEDURE — 2500000003 HC RX 250 WO HCPCS

## 2025-02-25 PROCEDURE — G0378 HOSPITAL OBSERVATION PER HR: HCPCS

## 2025-02-25 PROCEDURE — 2580000003 HC RX 258

## 2025-02-25 PROCEDURE — 7100000000 HC PACU RECOVERY - FIRST 15 MIN: Performed by: ORTHOPAEDIC SURGERY

## 2025-02-25 PROCEDURE — 6370000000 HC RX 637 (ALT 250 FOR IP): Performed by: ORTHOPAEDIC SURGERY

## 2025-02-25 PROCEDURE — 6370000000 HC RX 637 (ALT 250 FOR IP): Performed by: ANESTHESIOLOGY

## 2025-02-25 PROCEDURE — 3700000001 HC ADD 15 MINUTES (ANESTHESIA): Performed by: ORTHOPAEDIC SURGERY

## 2025-02-25 PROCEDURE — 2709999900 HC NON-CHARGEABLE SUPPLY: Performed by: ORTHOPAEDIC SURGERY

## 2025-02-25 PROCEDURE — C1776 JOINT DEVICE (IMPLANTABLE): HCPCS | Performed by: ORTHOPAEDIC SURGERY

## 2025-02-25 PROCEDURE — 2580000003 HC RX 258: Performed by: STUDENT IN AN ORGANIZED HEALTH CARE EDUCATION/TRAINING PROGRAM

## 2025-02-25 PROCEDURE — 3600000015 HC SURGERY LEVEL 5 ADDTL 15MIN: Performed by: ORTHOPAEDIC SURGERY

## 2025-02-25 PROCEDURE — 94660 CPAP INITIATION&MGMT: CPT

## 2025-02-25 PROCEDURE — 6360000002 HC RX W HCPCS

## 2025-02-25 PROCEDURE — 2720000010 HC SURG SUPPLY STERILE: Performed by: ORTHOPAEDIC SURGERY

## 2025-02-25 PROCEDURE — 2580000003 HC RX 258: Performed by: ORTHOPAEDIC SURGERY

## 2025-02-25 PROCEDURE — 94640 AIRWAY INHALATION TREATMENT: CPT

## 2025-02-25 PROCEDURE — 3700000000 HC ANESTHESIA ATTENDED CARE: Performed by: ORTHOPAEDIC SURGERY

## 2025-02-25 PROCEDURE — C1713 ANCHOR/SCREW BN/BN,TIS/BN: HCPCS | Performed by: ORTHOPAEDIC SURGERY

## 2025-02-25 PROCEDURE — 6360000002 HC RX W HCPCS: Performed by: ANESTHESIOLOGY

## 2025-02-25 PROCEDURE — 6360000002 HC RX W HCPCS: Performed by: ORTHOPAEDIC SURGERY

## 2025-02-25 DEVICE — GLENOSPHERE, LATERALIZED
Type: IMPLANTABLE DEVICE | Site: SHOULDER | Status: FUNCTIONAL
Brand: EQUINOXE

## 2025-02-25 DEVICE — IMPLANTABLE DEVICE
Type: IMPLANTABLE DEVICE | Site: SHOULDER | Status: FUNCTIONAL
Brand: EQUINOXE

## 2025-02-25 DEVICE — TRAY HUM OFFSET 0 MM SHLDR RVS ADPT STRL EQUINOXE: Type: IMPLANTABLE DEVICE | Site: SHOULDER | Status: FUNCTIONAL

## 2025-02-25 DEVICE — COMPONENT TOT SHLDR CAPPED S3EXACTECH] EXACTECH INC]: Type: IMPLANTABLE DEVICE | Status: FUNCTIONAL

## 2025-02-25 DEVICE — GLENOID PLATE
Type: IMPLANTABLE DEVICE | Site: SHOULDER | Status: FUNCTIONAL
Brand: EQUINOXE

## 2025-02-25 DEVICE — HUMERAL ADAPTER TRAY
Type: IMPLANTABLE DEVICE | Site: SHOULDER | Status: FUNCTIONAL
Brand: EQUINOXE®

## 2025-02-25 RX ORDER — BISACODYL 5 MG/1
5 TABLET, DELAYED RELEASE ORAL DAILY
Status: DISCONTINUED | OUTPATIENT
Start: 2025-02-25 | End: 2025-02-27 | Stop reason: HOSPADM

## 2025-02-25 RX ORDER — FAMOTIDINE 20 MG/1
20 TABLET, FILM COATED ORAL 2 TIMES DAILY
Status: DISCONTINUED | OUTPATIENT
Start: 2025-02-25 | End: 2025-02-27 | Stop reason: HOSPADM

## 2025-02-25 RX ORDER — SODIUM CHLORIDE 9 MG/ML
INJECTION, SOLUTION INTRAVENOUS PRN
Status: DISCONTINUED | OUTPATIENT
Start: 2025-02-25 | End: 2025-02-27 | Stop reason: HOSPADM

## 2025-02-25 RX ORDER — SODIUM CHLORIDE 0.9 % (FLUSH) 0.9 %
5-40 SYRINGE (ML) INJECTION EVERY 12 HOURS SCHEDULED
Status: DISCONTINUED | OUTPATIENT
Start: 2025-02-25 | End: 2025-02-25 | Stop reason: HOSPADM

## 2025-02-25 RX ORDER — SODIUM CHLORIDE, SODIUM LACTATE, POTASSIUM CHLORIDE, CALCIUM CHLORIDE 600; 310; 30; 20 MG/100ML; MG/100ML; MG/100ML; MG/100ML
INJECTION, SOLUTION INTRAVENOUS CONTINUOUS
Status: DISCONTINUED | OUTPATIENT
Start: 2025-02-25 | End: 2025-02-25 | Stop reason: HOSPADM

## 2025-02-25 RX ORDER — HYDROXYZINE HYDROCHLORIDE 10 MG/1
10 TABLET, FILM COATED ORAL EVERY 8 HOURS PRN
Status: DISCONTINUED | OUTPATIENT
Start: 2025-02-25 | End: 2025-02-27 | Stop reason: HOSPADM

## 2025-02-25 RX ORDER — HYDROMORPHONE HYDROCHLORIDE 1 MG/ML
0.5 INJECTION, SOLUTION INTRAMUSCULAR; INTRAVENOUS; SUBCUTANEOUS EVERY 5 MIN PRN
Status: COMPLETED | OUTPATIENT
Start: 2025-02-25 | End: 2025-02-25

## 2025-02-25 RX ORDER — ONDANSETRON 2 MG/ML
INJECTION INTRAMUSCULAR; INTRAVENOUS
Status: DISCONTINUED | OUTPATIENT
Start: 2025-02-25 | End: 2025-02-25 | Stop reason: SDUPTHER

## 2025-02-25 RX ORDER — FENTANYL CITRATE 50 UG/ML
INJECTION, SOLUTION INTRAMUSCULAR; INTRAVENOUS
Status: COMPLETED
Start: 2025-02-25 | End: 2025-02-25

## 2025-02-25 RX ORDER — LIDOCAINE HYDROCHLORIDE 20 MG/ML
INJECTION, SOLUTION EPIDURAL; INFILTRATION; INTRACAUDAL; PERINEURAL
Status: DISCONTINUED | OUTPATIENT
Start: 2025-02-25 | End: 2025-02-25 | Stop reason: SDUPTHER

## 2025-02-25 RX ORDER — ROCURONIUM BROMIDE 10 MG/ML
INJECTION, SOLUTION INTRAVENOUS
Status: DISCONTINUED | OUTPATIENT
Start: 2025-02-25 | End: 2025-02-25 | Stop reason: SDUPTHER

## 2025-02-25 RX ORDER — IPRATROPIUM BROMIDE AND ALBUTEROL SULFATE 2.5; .5 MG/3ML; MG/3ML
1 SOLUTION RESPIRATORY (INHALATION) ONCE
Status: COMPLETED | OUTPATIENT
Start: 2025-02-25 | End: 2025-02-25

## 2025-02-25 RX ORDER — ONDANSETRON 4 MG/1
4 TABLET, FILM COATED ORAL EVERY 8 HOURS PRN
Qty: 30 TABLET | Refills: 0 | Status: SHIPPED | OUTPATIENT
Start: 2025-02-25

## 2025-02-25 RX ORDER — FENTANYL CITRATE 50 UG/ML
25 INJECTION, SOLUTION INTRAMUSCULAR; INTRAVENOUS EVERY 5 MIN PRN
Status: COMPLETED | OUTPATIENT
Start: 2025-02-25 | End: 2025-02-25

## 2025-02-25 RX ORDER — FENTANYL CITRATE 50 UG/ML
INJECTION, SOLUTION INTRAMUSCULAR; INTRAVENOUS
Status: DISCONTINUED | OUTPATIENT
Start: 2025-02-25 | End: 2025-02-25 | Stop reason: SDUPTHER

## 2025-02-25 RX ORDER — ONDANSETRON 2 MG/ML
4 INJECTION INTRAMUSCULAR; INTRAVENOUS EVERY 6 HOURS PRN
Status: DISCONTINUED | OUTPATIENT
Start: 2025-02-25 | End: 2025-02-27 | Stop reason: HOSPADM

## 2025-02-25 RX ORDER — OXYCODONE HYDROCHLORIDE 5 MG/1
5 TABLET ORAL EVERY 4 HOURS PRN
Status: DISCONTINUED | OUTPATIENT
Start: 2025-02-25 | End: 2025-02-27 | Stop reason: HOSPADM

## 2025-02-25 RX ORDER — SODIUM CHLORIDE, SODIUM LACTATE, POTASSIUM CHLORIDE, CALCIUM CHLORIDE 600; 310; 30; 20 MG/100ML; MG/100ML; MG/100ML; MG/100ML
INJECTION, SOLUTION INTRAVENOUS
Status: DISCONTINUED | OUTPATIENT
Start: 2025-02-25 | End: 2025-02-25 | Stop reason: SDUPTHER

## 2025-02-25 RX ORDER — VANCOMYCIN HYDROCHLORIDE 1 G/20ML
INJECTION, POWDER, LYOPHILIZED, FOR SOLUTION INTRAVENOUS PRN
Status: DISCONTINUED | OUTPATIENT
Start: 2025-02-25 | End: 2025-02-25 | Stop reason: ALTCHOICE

## 2025-02-25 RX ORDER — TRANEXAMIC ACID 100 MG/ML
INJECTION, SOLUTION INTRAVENOUS
Status: DISCONTINUED | OUTPATIENT
Start: 2025-02-25 | End: 2025-02-25 | Stop reason: SDUPTHER

## 2025-02-25 RX ORDER — DEXMEDETOMIDINE HYDROCHLORIDE 100 UG/ML
INJECTION, SOLUTION INTRAVENOUS
Status: DISCONTINUED | OUTPATIENT
Start: 2025-02-25 | End: 2025-02-25 | Stop reason: SDUPTHER

## 2025-02-25 RX ORDER — SODIUM CHLORIDE 9 MG/ML
INJECTION, SOLUTION INTRAVENOUS PRN
Status: DISCONTINUED | OUTPATIENT
Start: 2025-02-25 | End: 2025-02-25 | Stop reason: HOSPADM

## 2025-02-25 RX ORDER — SENNA AND DOCUSATE SODIUM 50; 8.6 MG/1; MG/1
1 TABLET, FILM COATED ORAL 2 TIMES DAILY
Status: DISCONTINUED | OUTPATIENT
Start: 2025-02-25 | End: 2025-02-27 | Stop reason: HOSPADM

## 2025-02-25 RX ORDER — ACETAMINOPHEN 325 MG/1
650 TABLET ORAL EVERY 6 HOURS
Status: DISCONTINUED | OUTPATIENT
Start: 2025-02-25 | End: 2025-02-27 | Stop reason: HOSPADM

## 2025-02-25 RX ORDER — SODIUM CHLORIDE 9 MG/ML
INJECTION, SOLUTION INTRAVENOUS CONTINUOUS
Status: DISCONTINUED | OUTPATIENT
Start: 2025-02-25 | End: 2025-02-26

## 2025-02-25 RX ORDER — SODIUM CHLORIDE 0.9 % (FLUSH) 0.9 %
5-40 SYRINGE (ML) INJECTION PRN
Status: DISCONTINUED | OUTPATIENT
Start: 2025-02-25 | End: 2025-02-25 | Stop reason: HOSPADM

## 2025-02-25 RX ORDER — OXYCODONE HYDROCHLORIDE 5 MG/1
5 TABLET ORAL
Status: COMPLETED | OUTPATIENT
Start: 2025-02-25 | End: 2025-02-25

## 2025-02-25 RX ORDER — ACETAMINOPHEN 500 MG
1000 TABLET ORAL ONCE
Status: COMPLETED | OUTPATIENT
Start: 2025-02-25 | End: 2025-02-25

## 2025-02-25 RX ORDER — DEXAMETHASONE SODIUM PHOSPHATE 4 MG/ML
INJECTION, SOLUTION INTRA-ARTICULAR; INTRALESIONAL; INTRAMUSCULAR; INTRAVENOUS; SOFT TISSUE
Status: DISCONTINUED | OUTPATIENT
Start: 2025-02-25 | End: 2025-02-25 | Stop reason: SDUPTHER

## 2025-02-25 RX ORDER — FLUTICASONE PROPIONATE AND SALMETEROL XINAFOATE 45; 21 UG/1; UG/1
2 AEROSOL, METERED RESPIRATORY (INHALATION) 2 TIMES DAILY
COMMUNITY

## 2025-02-25 RX ORDER — ONDANSETRON 4 MG/1
4 TABLET, ORALLY DISINTEGRATING ORAL EVERY 8 HOURS PRN
Status: DISCONTINUED | OUTPATIENT
Start: 2025-02-25 | End: 2025-02-27 | Stop reason: HOSPADM

## 2025-02-25 RX ORDER — EPHEDRINE SULFATE/0.9% NACL/PF 50 MG/5 ML
SYRINGE (ML) INTRAVENOUS
Status: DISCONTINUED | OUTPATIENT
Start: 2025-02-25 | End: 2025-02-25 | Stop reason: SDUPTHER

## 2025-02-25 RX ORDER — LIDOCAINE HYDROCHLORIDE 10 MG/ML
1 INJECTION, SOLUTION EPIDURAL; INFILTRATION; INTRACAUDAL; PERINEURAL
Status: DISCONTINUED | OUTPATIENT
Start: 2025-02-25 | End: 2025-02-25 | Stop reason: HOSPADM

## 2025-02-25 RX ORDER — SODIUM CHLORIDE 0.9 % (FLUSH) 0.9 %
5-40 SYRINGE (ML) INJECTION PRN
Status: DISCONTINUED | OUTPATIENT
Start: 2025-02-25 | End: 2025-02-27 | Stop reason: HOSPADM

## 2025-02-25 RX ORDER — NALOXONE HYDROCHLORIDE 0.4 MG/ML
INJECTION, SOLUTION INTRAMUSCULAR; INTRAVENOUS; SUBCUTANEOUS PRN
Status: DISCONTINUED | OUTPATIENT
Start: 2025-02-25 | End: 2025-02-25 | Stop reason: HOSPADM

## 2025-02-25 RX ORDER — SODIUM CHLORIDE 0.9 % (FLUSH) 0.9 %
5-40 SYRINGE (ML) INJECTION EVERY 12 HOURS SCHEDULED
Status: DISCONTINUED | OUTPATIENT
Start: 2025-02-25 | End: 2025-02-27 | Stop reason: HOSPADM

## 2025-02-25 RX ORDER — PROCHLORPERAZINE EDISYLATE 5 MG/ML
5 INJECTION INTRAMUSCULAR; INTRAVENOUS
Status: DISCONTINUED | OUTPATIENT
Start: 2025-02-25 | End: 2025-02-25 | Stop reason: HOSPADM

## 2025-02-25 RX ORDER — IPRATROPIUM BROMIDE AND ALBUTEROL SULFATE 2.5; .5 MG/3ML; MG/3ML
1 SOLUTION RESPIRATORY (INHALATION) EVERY 4 HOURS PRN
Status: DISCONTINUED | OUTPATIENT
Start: 2025-02-25 | End: 2025-02-27 | Stop reason: HOSPADM

## 2025-02-25 RX ORDER — POLYETHYLENE GLYCOL 3350 17 G/17G
17 POWDER, FOR SOLUTION ORAL DAILY
Status: DISCONTINUED | OUTPATIENT
Start: 2025-02-25 | End: 2025-02-27 | Stop reason: HOSPADM

## 2025-02-25 RX ORDER — OXYCODONE HYDROCHLORIDE 5 MG/1
10 TABLET ORAL EVERY 4 HOURS PRN
Status: DISCONTINUED | OUTPATIENT
Start: 2025-02-25 | End: 2025-02-27 | Stop reason: HOSPADM

## 2025-02-25 RX ORDER — MIDAZOLAM HYDROCHLORIDE 1 MG/ML
INJECTION, SOLUTION INTRAMUSCULAR; INTRAVENOUS
Status: DISCONTINUED | OUTPATIENT
Start: 2025-02-25 | End: 2025-02-25 | Stop reason: SDUPTHER

## 2025-02-25 RX ORDER — MORPHINE SULFATE 4 MG/ML
4 INJECTION, SOLUTION INTRAMUSCULAR; INTRAVENOUS
Status: DISCONTINUED | OUTPATIENT
Start: 2025-02-25 | End: 2025-02-26

## 2025-02-25 RX ORDER — CEFAZOLIN SODIUM/WATER 2 G/20 ML
SYRINGE (ML) INTRAVENOUS
Status: DISCONTINUED | OUTPATIENT
Start: 2025-02-25 | End: 2025-02-25 | Stop reason: SDUPTHER

## 2025-02-25 RX ORDER — MORPHINE SULFATE 2 MG/ML
2 INJECTION, SOLUTION INTRAMUSCULAR; INTRAVENOUS
Status: DISCONTINUED | OUTPATIENT
Start: 2025-02-25 | End: 2025-02-27 | Stop reason: HOSPADM

## 2025-02-25 RX ORDER — ONDANSETRON 2 MG/ML
4 INJECTION INTRAMUSCULAR; INTRAVENOUS
Status: COMPLETED | OUTPATIENT
Start: 2025-02-25 | End: 2025-02-25

## 2025-02-25 RX ADMIN — Medication 10 MG: at 12:03

## 2025-02-25 RX ADMIN — DEXMEDETOMIDINE 10 MCG: 100 INJECTION, SOLUTION INTRAVENOUS at 11:49

## 2025-02-25 RX ADMIN — DEXMEDETOMIDINE 10 MCG: 100 INJECTION, SOLUTION INTRAVENOUS at 12:55

## 2025-02-25 RX ADMIN — PHENYLEPHRINE HYDROCHLORIDE 30 MCG/MIN: 10 INJECTION INTRAVENOUS at 12:19

## 2025-02-25 RX ADMIN — TRANEXAMIC ACID 1000 MG: 100 INJECTION, SOLUTION INTRAVENOUS at 11:32

## 2025-02-25 RX ADMIN — HYDROMORPHONE HYDROCHLORIDE 0.25 MG: 1 INJECTION, SOLUTION INTRAMUSCULAR; INTRAVENOUS; SUBCUTANEOUS at 12:59

## 2025-02-25 RX ADMIN — PROPOFOL 160 MG: 10 INJECTION, EMULSION INTRAVENOUS at 11:28

## 2025-02-25 RX ADMIN — MORPHINE SULFATE 4 MG: 4 INJECTION, SOLUTION INTRAMUSCULAR; INTRAVENOUS at 18:23

## 2025-02-25 RX ADMIN — Medication 3 AMPULE: at 10:14

## 2025-02-25 RX ADMIN — FENTANYL CITRATE 25 MCG: 50 INJECTION INTRAMUSCULAR; INTRAVENOUS at 13:45

## 2025-02-25 RX ADMIN — SODIUM CHLORIDE, POTASSIUM CHLORIDE, SODIUM LACTATE AND CALCIUM CHLORIDE: 600; 310; 30; 20 INJECTION, SOLUTION INTRAVENOUS at 11:15

## 2025-02-25 RX ADMIN — FENTANYL CITRATE 50 MCG: 50 INJECTION, SOLUTION INTRAMUSCULAR; INTRAVENOUS at 11:28

## 2025-02-25 RX ADMIN — FAMOTIDINE 20 MG: 20 TABLET, FILM COATED ORAL at 21:18

## 2025-02-25 RX ADMIN — PROPOFOL 180 MCG/KG/MIN: 10 INJECTION, EMULSION INTRAVENOUS at 11:34

## 2025-02-25 RX ADMIN — HYDROXYZINE HYDROCHLORIDE 10 MG: 10 TABLET ORAL at 23:38

## 2025-02-25 RX ADMIN — PROPOFOL 150 MCG/KG/MIN: 10 INJECTION, EMULSION INTRAVENOUS at 11:30

## 2025-02-25 RX ADMIN — POLYETHYLENE GLYCOL 3350 17 G: 17 POWDER, FOR SOLUTION ORAL at 21:18

## 2025-02-25 RX ADMIN — ACETAMINOPHEN 1000 MG: 500 TABLET, FILM COATED ORAL at 10:14

## 2025-02-25 RX ADMIN — HYDROMORPHONE HYDROCHLORIDE 0.25 MG: 1 INJECTION, SOLUTION INTRAMUSCULAR; INTRAVENOUS; SUBCUTANEOUS at 13:10

## 2025-02-25 RX ADMIN — Medication 2 G: at 11:33

## 2025-02-25 RX ADMIN — HYDROMORPHONE HYDROCHLORIDE 0.5 MG: 1 INJECTION, SOLUTION INTRAMUSCULAR; INTRAVENOUS; SUBCUTANEOUS at 13:23

## 2025-02-25 RX ADMIN — FENTANYL CITRATE 25 MCG: 50 INJECTION INTRAMUSCULAR; INTRAVENOUS at 13:40

## 2025-02-25 RX ADMIN — FENTANYL CITRATE 50 MCG: 50 INJECTION, SOLUTION INTRAMUSCULAR; INTRAVENOUS at 11:53

## 2025-02-25 RX ADMIN — WATER 2000 MG: 1 INJECTION INTRAMUSCULAR; INTRAVENOUS; SUBCUTANEOUS at 21:18

## 2025-02-25 RX ADMIN — SENNOSIDES AND DOCUSATE SODIUM 1 TABLET: 50; 8.6 TABLET ORAL at 21:18

## 2025-02-25 RX ADMIN — FENTANYL CITRATE 25 MCG: 50 INJECTION INTRAMUSCULAR; INTRAVENOUS at 13:30

## 2025-02-25 RX ADMIN — MORPHINE SULFATE 4 MG: 4 INJECTION, SOLUTION INTRAMUSCULAR; INTRAVENOUS at 23:39

## 2025-02-25 RX ADMIN — ROCURONIUM BROMIDE 20 MG: 10 INJECTION INTRAVENOUS at 12:28

## 2025-02-25 RX ADMIN — ROCURONIUM BROMIDE 20 MG: 10 INJECTION INTRAVENOUS at 11:53

## 2025-02-25 RX ADMIN — SODIUM CHLORIDE, POTASSIUM CHLORIDE, SODIUM LACTATE AND CALCIUM CHLORIDE: 600; 310; 30; 20 INJECTION, SOLUTION INTRAVENOUS at 10:15

## 2025-02-25 RX ADMIN — ACETAMINOPHEN 650 MG: 325 TABLET ORAL at 18:25

## 2025-02-25 RX ADMIN — ROCURONIUM BROMIDE 50 MG: 10 INJECTION INTRAVENOUS at 11:28

## 2025-02-25 RX ADMIN — DEXAMETHASONE SODIUM PHOSPHATE 8 MG: 4 INJECTION, SOLUTION INTRAMUSCULAR; INTRAVENOUS at 11:38

## 2025-02-25 RX ADMIN — Medication 10 MG: at 12:19

## 2025-02-25 RX ADMIN — IPRATROPIUM BROMIDE AND ALBUTEROL SULFATE 1 DOSE: .5; 3 SOLUTION RESPIRATORY (INHALATION) at 10:46

## 2025-02-25 RX ADMIN — TRANEXAMIC ACID 1000 MG: 100 INJECTION, SOLUTION INTRAVENOUS at 12:50

## 2025-02-25 RX ADMIN — ONDANSETRON HYDROCHLORIDE 4 MG: 2 INJECTION, SOLUTION INTRAMUSCULAR; INTRAVENOUS at 11:38

## 2025-02-25 RX ADMIN — HYDROMORPHONE HYDROCHLORIDE 0.5 MG: 1 INJECTION, SOLUTION INTRAMUSCULAR; INTRAVENOUS; SUBCUTANEOUS at 12:43

## 2025-02-25 RX ADMIN — ACETAMINOPHEN 650 MG: 325 TABLET ORAL at 23:38

## 2025-02-25 RX ADMIN — HYDROMORPHONE HYDROCHLORIDE 0.5 MG: 1 INJECTION, SOLUTION INTRAMUSCULAR; INTRAVENOUS; SUBCUTANEOUS at 13:49

## 2025-02-25 RX ADMIN — OXYCODONE 5 MG: 5 TABLET ORAL at 15:59

## 2025-02-25 RX ADMIN — FENTANYL CITRATE 25 MCG: 50 INJECTION INTRAMUSCULAR; INTRAVENOUS at 13:34

## 2025-02-25 RX ADMIN — LIDOCAINE HYDROCHLORIDE 100 MG: 20 INJECTION, SOLUTION EPIDURAL; INFILTRATION; INTRACAUDAL; PERINEURAL at 11:28

## 2025-02-25 RX ADMIN — ONDANSETRON 4 MG: 2 INJECTION, SOLUTION INTRAMUSCULAR; INTRAVENOUS at 16:17

## 2025-02-25 RX ADMIN — MIDAZOLAM HYDROCHLORIDE 1 MG: 1 INJECTION, SOLUTION INTRAMUSCULAR; INTRAVENOUS at 12:20

## 2025-02-25 RX ADMIN — SODIUM CHLORIDE, PRESERVATIVE FREE 10 ML: 5 INJECTION INTRAVENOUS at 21:18

## 2025-02-25 RX ADMIN — HYDROMORPHONE HYDROCHLORIDE 0.5 MG: 1 INJECTION, SOLUTION INTRAMUSCULAR; INTRAVENOUS; SUBCUTANEOUS at 13:55

## 2025-02-25 ASSESSMENT — COPD QUESTIONNAIRES: CAT_SEVERITY: SEVERE

## 2025-02-25 ASSESSMENT — PAIN SCALES - GENERAL
PAINLEVEL_OUTOF10: 5
PAINLEVEL_OUTOF10: 10
PAINLEVEL_OUTOF10: 8
PAINLEVEL_OUTOF10: 10

## 2025-02-25 ASSESSMENT — PAIN DESCRIPTION - DESCRIPTORS
DESCRIPTORS: ACHING;PRESSURE
DESCRIPTORS: ACHING

## 2025-02-25 ASSESSMENT — PAIN DESCRIPTION - LOCATION
LOCATION: ARM
LOCATION: SHOULDER

## 2025-02-25 ASSESSMENT — PAIN DESCRIPTION - ORIENTATION: ORIENTATION: RIGHT;UPPER

## 2025-02-25 ASSESSMENT — PAIN - FUNCTIONAL ASSESSMENT: PAIN_FUNCTIONAL_ASSESSMENT: PREVENTS OR INTERFERES SOME ACTIVE ACTIVITIES AND ADLS

## 2025-02-25 NOTE — ANESTHESIA POSTPROCEDURE EVALUATION
Department of Anesthesiology  Postprocedure Note    Patient: Marty Villegas  MRN: [id number]  YOB: 1960  Date of evaluation: 2/25/2025    Procedure Summary       Date: 02/25/25 Room / Location: [facility] MAIN OR M4 / [facility] MAIN OR    Anesthesia Start: 1115 Anesthesia Stop: 1325    Procedure: RIGHT REVERSE TOTAL SHOULDER ARTHROPLASTY (GENERAL WITH BLOCK) (Right: Shoulder) Diagnosis:       Osteoarthritis, localized, shoulder, right      (Osteoarthritis, localized, shoulder, right [M19.011])    Providers: Best Arana DO Responsible Provider: Tres Cunningham MD    Anesthesia Type: general ASA Status: 3            Anesthesia Type: No value filed.    Becki Phase I: Becki Score: 8    Becki Phase II:      Anesthesia Post Evaluation    Patient location during evaluation: PACU  Patient participation: complete - patient participated  Level of consciousness: sleepy but conscious and responsive to verbal stimuli  Airway patency: patent  Nausea & Vomiting: no vomiting and no nausea  Cardiovascular status: blood pressure returned to baseline and hemodynamically stable  Respiratory status: acceptable  Hydration status: stable    No notable events documented.

## 2025-02-25 NOTE — PERIOP NOTE
3PM  Updated pt brother.  Pt had mentioned wanting to stay earlier due to uncontrolled pain. Pt now sleeping. Updated Dr Arana and Sharita NP. Will discuss with patient once she is more awake to see how she feels about discharge home.     4:17 PM  Pt awake and feels SOB with only 1L NC, up to 2L with SpO2 90-93%. Pain is 6/10. Oxy PO given at 4PM.  Notified NP pt is agreeable to stay overnight.  Pt wears CPAP with 2L chronic at night while asleep.    Pt sat up in bed, pt feels dizzy and nauseous. Zofran given.     4:29 PM  Pt sleeping. O2 88%, NC up to 4l NC to keep SpO2 >90%  NP placed admission orders

## 2025-02-25 NOTE — OP NOTE
Operative Note      Patient: Marty Villegas  YOB: 1960  MRN: [id number]    Date of Procedure: 2/25/2025    Pre-Op Diagnosis Codes:      Right Rotator Cuff Arthropathy    Post-Op Diagnosis: Post-Op Diagnosis Codes:       Right Rotator Cuff Arthropathy        Procedure(s):  RIGHT REVERSE TOTAL SHOULDER ARTHROPLASTY (GENERAL WITH BLOCK)    Surgeon(s):  Best Arana DO    Assistant:   Physician Assistant: Gabriella Self PA-C    Anesthesia: General    Estimated Blood Loss (mL): less than 100     Complications: None    Specimens:   * No specimens in log *    Implants:  Implant Name Type Inv. Item Serial No.  Lot No. LRB No. Used Action   BASEPLATE MALLORIE AUG REVERSED 8 DEG SM RT POST SHLDR [employer] - [id number]  BASEPLATE MALLORIE AUG REVERSED 8 DEG SM RT POST SHLDR EQUINOXE [id number] [employer] N/A Right 1 Implanted   SCREW BNE GLENOSPHERE SHLDR CRISTINA MIKE REV E[employer]IN[employer]E - [id number]  SCREW BNE GLENOSPHERE SHLDR CRISTINA MIKE REV E[employer]INOXE [id number] [employer] N/A Right 1 Implanted   SPHERE MALLORIE LAT SM 36 MM SHLDR GLENOSPHERE RVS EXP E[employer]IN[employer]E - [id number]  SPHERE MALLORIE LAT SM 36 MM SHLDR GLENOSPHERE RVS EXP EQUINOXE [id number] [employer] N/A Right 1 Implanted   KIT BNE SCR L34MM DIA4.5MM MALLORIE SHLDR RED COMPR MIKE CAP REV - [id number]  KIT BNE SCR L34MM DIA4.5MM MALLORIE SHLDR RED COMPR MIKE CAP REV [id number] [employer] N/A Right 1 Implanted   KIT BNE SCR EQUINOXE L30MM DIA4.5MM MALLORIE SHLDR TONY COMPR MIKE CAP REV - [id number]  KIT BNE SCR EQUINOXE L30MM DIA4.5MM MALLORIE SHLDR TONY COMPR MIKE CAP REV [id number] [employer] N/A Right 1 Implanted   KIT BNE SCR EQUINOXE L30MM DIA4.5MM MALLORIE SHLDR TONY COMPR MIKE CAP REV - [id number]  KIT BNE SCR EQUINOXE L30MM DIA4.5MM MALLORIE SHLDR TONY COMPR MIKE CAP REV [id number] [employer] N/A Right 1 Implanted   KIT BNE SCR TORQ DEFINING SHLDR CRISTINA FIX ANG REV EQUINOXE - [id number]  KIT BNE SCR TORQ DEFINING SHLDR CRISTINA FIX ANG REV EQUINOXE [id number] [employer] N/A Right 1

## 2025-02-25 NOTE — H&P
lung.    Impression  1.  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    2.  Advanced osteoarthrosis of the glenohumeral joint with large osteophytes,  joint space narrowing and subcortical cysts.    3.  Mild to moderate osteoarthrosis of the acromioclavicular joint.    4.  Right upper lobe lung nodules.    Electronically signed by Kip Park          Labs: No results found for this or any previous visit (from the past 24 hour(s)).      Impression:     Patient Active Problem List    Diagnosis Date Noted    COPD exacerbation ([facility]) 12/17/2023    Chronic obstructive pulmonary disease ([facility]) 07/05/2023    PAH (pulmonary artery hypertension) ([facility]) 07/05/2023    Depression 07/05/2023    Recurrent incisional hernia 11/10/2022     Active Problems:    * No active hospital problems. *  Resolved Problems:    * No resolved hospital problems. *      Plan:       -  Medically optimized and cleared for OR  -  We have discussed surgical procedure, right reverse total shoulder arthroplasty.    The risks of surgery were explained.  Risks of infection, blood loss, neurovascular injury, anesthesia risks, and risks secondary to patient comorbidities were explained.            Best Arana D.O. was available for immediate consultation as the supervising physician.  Please note that this dictation was completed with computer voice recognition software. Quite often unanticipated grammatical, syntax, homophones, and other interpretive errors are inadvertently transcribed by the computer software. Please disregard and excuse any errors that have escaped final proofreading.    Gabriella Self PA-C  [facility]

## 2025-02-25 NOTE — ANESTHESIA PRE PROCEDURE
Department of Anesthesiology  Preprocedure Note       Name:  Marty Villegas   Age:  64 y.o.  :  1960                                          MRN:  [id number]         Date:  2025      Surgeon: Surgeon(s):  Best Arana DO    Procedure: Procedure(s):  RIGHT REVERSE TOTAL SHOULDER ARTHROPLASTY (GENERAL WITH BLOCK)    Medications prior to admission:   Prior to Admission medications    Medication Sig Start Date End Date Taking? Authorizing Provider   fluticasone-salmeterol (ADVAIR HFA) 45-21 MCG/ACT inhaler Inhale 2 puffs into the lungs 2 times daily   Yes Provider, Historical, MD   fluticasone-umeclidin-vilant (TRELEGY ELLIPTA) 100-62.5-25 MCG/ACT AEPB inhaler Inhale 1 puff into the lungs daily   Yes Provider, Historical, MD   Budesonide-Formoterol Fumarate (SYMBICORT IN) Inhale into the lungs daily   Yes Provider, MD Frantz   meloxicam (MOBIC) 15 MG tablet TAKE 1 TABLET BY MOUTH DAILY 25  Yes Jagruti Diallo PA   traZODone (DESYREL) 50 MG tablet Take 1 tablet by mouth nightly 24  Yes Shayne Lay MD   escitalopram (LEXAPRO) 20 MG tablet Take 1 tablet by mouth daily 24  Yes Shayne Lay MD   albuterol sulfate HFA (PROVENTIL;VENTOLIN;PROAIR) 108 (90 Base) MCG/ACT inhaler Inhale 2 puffs into the lungs every 4 hours as needed   Yes Automatic Reconciliation, Ar   levothyroxine (SYNTHROID) 112 MCG tablet Take 1 tablet by mouth every morning (before breakfast)   Yes Automatic Reconciliation, Ar   pantoprazole (PROTONIX) 20 MG tablet Take 1 tablet by mouth as needed   Yes Automatic Reconciliation, Ar   valACYclovir (VALTREX) 1 g tablet Take 1 tablet by mouth as needed 22  Yes Automatic Reconciliation, Ar   oxyCODONE (ROXICODONE) 5 MG immediate release tablet Take 1 tablet by mouth every 4-6 hours as needed for Pain for up to 7 days. Intended supply: 7 days. Take lowest dose possible to manage pain Max Daily Amount: 30 mg 25  Gabriella Self

## 2025-02-26 ENCOUNTER — APPOINTMENT (OUTPATIENT)
Facility: HOSPITAL | Age: 65
End: 2025-02-26
Attending: ORTHOPAEDIC SURGERY
Payer: MEDICARE

## 2025-02-26 LAB
ANION GAP SERPL CALC-SCNC: 4 MMOL/L (ref 2–12)
BUN SERPL-MCNC: 12 MG/DL (ref 6–20)
BUN/CREAT SERPL: 19 (ref 12–20)
CALCIUM SERPL-MCNC: 8.1 MG/DL (ref 8.5–10.1)
CHLORIDE SERPL-SCNC: 102 MMOL/L (ref 97–108)
CO2 SERPL-SCNC: 29 MMOL/L (ref 21–32)
CREAT SERPL-MCNC: 0.63 MG/DL (ref 0.55–1.02)
GLUCOSE BLD STRIP.AUTO-MCNC: 117 MG/DL (ref 65–117)
GLUCOSE BLD STRIP.AUTO-MCNC: 192 MG/DL (ref 65–117)
GLUCOSE SERPL-MCNC: 125 MG/DL (ref 65–100)
HCT VFR BLD AUTO: 37.8 % (ref 35–47)
HGB BLD-MCNC: 12 G/DL (ref 11.5–16)
POTASSIUM SERPL-SCNC: 3.8 MMOL/L (ref 3.5–5.1)
SERVICE CMNT-IMP: ABNORMAL
SERVICE CMNT-IMP: NORMAL
SODIUM SERPL-SCNC: 135 MMOL/L (ref 136–145)

## 2025-02-26 PROCEDURE — 6370000000 HC RX 637 (ALT 250 FOR IP)

## 2025-02-26 PROCEDURE — 94640 AIRWAY INHALATION TREATMENT: CPT

## 2025-02-26 PROCEDURE — 6360000002 HC RX W HCPCS: Performed by: ORTHOPAEDIC SURGERY

## 2025-02-26 PROCEDURE — 71045 X-RAY EXAM CHEST 1 VIEW: CPT

## 2025-02-26 PROCEDURE — 6370000000 HC RX 637 (ALT 250 FOR IP): Performed by: INTERNAL MEDICINE

## 2025-02-26 PROCEDURE — G0378 HOSPITAL OBSERVATION PER HR: HCPCS

## 2025-02-26 PROCEDURE — 97110 THERAPEUTIC EXERCISES: CPT

## 2025-02-26 PROCEDURE — 85018 HEMOGLOBIN: CPT

## 2025-02-26 PROCEDURE — 96375 TX/PRO/DX INJ NEW DRUG ADDON: CPT

## 2025-02-26 PROCEDURE — 2700000000 HC OXYGEN THERAPY PER DAY

## 2025-02-26 PROCEDURE — 97535 SELF CARE MNGMENT TRAINING: CPT

## 2025-02-26 PROCEDURE — 82962 GLUCOSE BLOOD TEST: CPT

## 2025-02-26 PROCEDURE — 96374 THER/PROPH/DIAG INJ IV PUSH: CPT

## 2025-02-26 PROCEDURE — 6370000000 HC RX 637 (ALT 250 FOR IP): Performed by: ORTHOPAEDIC SURGERY

## 2025-02-26 PROCEDURE — 85014 HEMATOCRIT: CPT

## 2025-02-26 PROCEDURE — 97165 OT EVAL LOW COMPLEX 30 MIN: CPT

## 2025-02-26 PROCEDURE — 2500000003 HC RX 250 WO HCPCS: Performed by: INTERNAL MEDICINE

## 2025-02-26 PROCEDURE — 94660 CPAP INITIATION&MGMT: CPT

## 2025-02-26 PROCEDURE — 6360000002 HC RX W HCPCS

## 2025-02-26 PROCEDURE — 80048 BASIC METABOLIC PNL TOTAL CA: CPT

## 2025-02-26 PROCEDURE — APPNB180 APP NON BILLABLE TIME > 60 MINS

## 2025-02-26 PROCEDURE — 6360000002 HC RX W HCPCS: Performed by: INTERNAL MEDICINE

## 2025-02-26 PROCEDURE — 2500000003 HC RX 250 WO HCPCS: Performed by: ORTHOPAEDIC SURGERY

## 2025-02-26 PROCEDURE — 36415 COLL VENOUS BLD VENIPUNCTURE: CPT

## 2025-02-26 RX ORDER — PREDNISONE 20 MG/1
40 TABLET ORAL DAILY
Status: DISCONTINUED | OUTPATIENT
Start: 2025-02-27 | End: 2025-02-27 | Stop reason: HOSPADM

## 2025-02-26 RX ORDER — IPRATROPIUM BROMIDE AND ALBUTEROL SULFATE 2.5; .5 MG/3ML; MG/3ML
1 SOLUTION RESPIRATORY (INHALATION)
Status: DISCONTINUED | OUTPATIENT
Start: 2025-02-26 | End: 2025-02-27 | Stop reason: HOSPADM

## 2025-02-26 RX ORDER — ALBUTEROL SULFATE 0.83 MG/ML
2.5 SOLUTION RESPIRATORY (INHALATION) EVERY 6 HOURS PRN
Status: DISCONTINUED | OUTPATIENT
Start: 2025-02-26 | End: 2025-02-27 | Stop reason: HOSPADM

## 2025-02-26 RX ORDER — AZITHROMYCIN 250 MG/1
500 TABLET, FILM COATED ORAL DAILY
Status: DISCONTINUED | OUTPATIENT
Start: 2025-02-26 | End: 2025-02-27 | Stop reason: HOSPADM

## 2025-02-26 RX ORDER — MONTELUKAST SODIUM 10 MG/1
10 TABLET ORAL NIGHTLY
Status: DISCONTINUED | OUTPATIENT
Start: 2025-02-26 | End: 2025-02-27 | Stop reason: HOSPADM

## 2025-02-26 RX ORDER — HYDRALAZINE HYDROCHLORIDE 20 MG/ML
10 INJECTION INTRAMUSCULAR; INTRAVENOUS EVERY 6 HOURS PRN
Status: DISCONTINUED | OUTPATIENT
Start: 2025-02-26 | End: 2025-02-27 | Stop reason: HOSPADM

## 2025-02-26 RX ORDER — ALBUTEROL SULFATE 90 UG/1
2 INHALANT RESPIRATORY (INHALATION) EVERY 4 HOURS PRN
Status: DISCONTINUED | OUTPATIENT
Start: 2025-02-26 | End: 2025-02-26

## 2025-02-26 RX ORDER — LEVOTHYROXINE SODIUM 112 UG/1
112 TABLET ORAL
Status: DISCONTINUED | OUTPATIENT
Start: 2025-02-26 | End: 2025-02-27 | Stop reason: HOSPADM

## 2025-02-26 RX ORDER — ESCITALOPRAM OXALATE 10 MG/1
20 TABLET ORAL DAILY
Status: DISCONTINUED | OUTPATIENT
Start: 2025-02-26 | End: 2025-02-27 | Stop reason: HOSPADM

## 2025-02-26 RX ADMIN — IPRATROPIUM BROMIDE AND ALBUTEROL SULFATE 1 DOSE: 2.5; .5 SOLUTION RESPIRATORY (INHALATION) at 21:00

## 2025-02-26 RX ADMIN — BISACODYL 5 MG: 5 TABLET, COATED ORAL at 09:36

## 2025-02-26 RX ADMIN — FAMOTIDINE 20 MG: 20 TABLET, FILM COATED ORAL at 09:37

## 2025-02-26 RX ADMIN — MORPHINE SULFATE 4 MG: 4 INJECTION, SOLUTION INTRAMUSCULAR; INTRAVENOUS at 03:57

## 2025-02-26 RX ADMIN — SODIUM CHLORIDE, PRESERVATIVE FREE 10 ML: 5 INJECTION INTRAVENOUS at 09:37

## 2025-02-26 RX ADMIN — OXYCODONE 10 MG: 5 TABLET ORAL at 16:18

## 2025-02-26 RX ADMIN — WATER 60 MG: 1 INJECTION INTRAMUSCULAR; INTRAVENOUS; SUBCUTANEOUS at 13:27

## 2025-02-26 RX ADMIN — IPRATROPIUM BROMIDE AND ALBUTEROL SULFATE 1 DOSE: 2.5; .5 SOLUTION RESPIRATORY (INHALATION) at 14:54

## 2025-02-26 RX ADMIN — ARFORMOTEROL TARTRATE: 15 SOLUTION RESPIRATORY (INHALATION) at 21:05

## 2025-02-26 RX ADMIN — LEVOTHYROXINE SODIUM 112 MCG: 0.11 TABLET ORAL at 09:36

## 2025-02-26 RX ADMIN — OXYCODONE 10 MG: 5 TABLET ORAL at 21:46

## 2025-02-26 RX ADMIN — SODIUM CHLORIDE, PRESERVATIVE FREE 10 ML: 5 INJECTION INTRAVENOUS at 21:48

## 2025-02-26 RX ADMIN — ACETAMINOPHEN 650 MG: 325 TABLET ORAL at 06:15

## 2025-02-26 RX ADMIN — ESCITALOPRAM OXALATE 20 MG: 10 TABLET ORAL at 09:36

## 2025-02-26 RX ADMIN — ARFORMOTEROL TARTRATE: 15 SOLUTION RESPIRATORY (INHALATION) at 08:35

## 2025-02-26 RX ADMIN — WATER 2000 MG: 1 INJECTION INTRAMUSCULAR; INTRAVENOUS; SUBCUTANEOUS at 03:57

## 2025-02-26 RX ADMIN — FAMOTIDINE 20 MG: 20 TABLET, FILM COATED ORAL at 21:46

## 2025-02-26 RX ADMIN — ACETAMINOPHEN 650 MG: 325 TABLET ORAL at 17:47

## 2025-02-26 RX ADMIN — SENNOSIDES AND DOCUSATE SODIUM 1 TABLET: 50; 8.6 TABLET ORAL at 09:35

## 2025-02-26 RX ADMIN — OXYCODONE 10 MG: 5 TABLET ORAL at 00:58

## 2025-02-26 RX ADMIN — SENNOSIDES AND DOCUSATE SODIUM 1 TABLET: 50; 8.6 TABLET ORAL at 21:46

## 2025-02-26 RX ADMIN — AZITHROMYCIN 500 MG: 250 TABLET, FILM COATED ORAL at 13:26

## 2025-02-26 RX ADMIN — MONTELUKAST 10 MG: 10 TABLET, FILM COATED ORAL at 21:46

## 2025-02-26 RX ADMIN — ACETAMINOPHEN 650 MG: 325 TABLET ORAL at 13:26

## 2025-02-26 RX ADMIN — OXYCODONE 10 MG: 5 TABLET ORAL at 10:34

## 2025-02-26 RX ADMIN — OXYCODONE 10 MG: 5 TABLET ORAL at 06:16

## 2025-02-26 ASSESSMENT — PAIN DESCRIPTION - LOCATION
LOCATION: SHOULDER
LOCATION: SHOULDER
LOCATION: SHOULDER;ARM
LOCATION: SHOULDER
LOCATION: SHOULDER

## 2025-02-26 ASSESSMENT — PAIN SCALES - GENERAL
PAINLEVEL_OUTOF10: 7
PAINLEVEL_OUTOF10: 8
PAINLEVEL_OUTOF10: 8
PAINLEVEL_OUTOF10: 7
PAINLEVEL_OUTOF10: 6

## 2025-02-26 ASSESSMENT — PAIN DESCRIPTION - ORIENTATION
ORIENTATION: RIGHT;UPPER
ORIENTATION: RIGHT;UPPER
ORIENTATION: RIGHT

## 2025-02-26 ASSESSMENT — PAIN - FUNCTIONAL ASSESSMENT
PAIN_FUNCTIONAL_ASSESSMENT: PREVENTS OR INTERFERES SOME ACTIVE ACTIVITIES AND ADLS
PAIN_FUNCTIONAL_ASSESSMENT: ACTIVITIES ARE NOT PREVENTED
PAIN_FUNCTIONAL_ASSESSMENT: PREVENTS OR INTERFERES SOME ACTIVE ACTIVITIES AND ADLS

## 2025-02-26 ASSESSMENT — PAIN DESCRIPTION - DESCRIPTORS
DESCRIPTORS: ACHING;DISCOMFORT;DULL
DESCRIPTORS: ACHING;PRESSURE
DESCRIPTORS: ACHING;PRESSURE

## 2025-02-26 NOTE — CARE COORDINATION
Transition of Care Plan:    RUR: OBS  Prior Level of Functioning:   Disposition: Home w/family  CLARITA:   If SNF or IPR: Date FOC offered:   Date FOC received:   Accepting facility:   Date authorization started with reference number:   Date authorization received and expires:   Follow up appointments:   DME needed: n/a  Transportation at discharge: brother  IM/IMM Medicare/ letter given: n/a  Is patient a [place] and connected with [facility]?    If yes, was [place] transfer form completed and [facility] notified?   Caregiver Contact:   Discharge Caregiver contacted prior to discharge? Pt to contact  Care Conference needed?   Barriers to discharge:     Due to shoulder surgery, pt is unable to sign OBS.  Explanation provided & copy given to pt.  Per VIRAJ Mendez, pt does not need HH.  No needs or concerns identified at this time.    Abbie Lopez  Ext [phone]

## 2025-02-26 NOTE — PROGRESS NOTES
OCCUPATIONAL THERAPY EVALUATION/DISCHARGE  Patient: Marty Villegas (64 y.o. female)  Date: 2/26/2025  Primary Diagnosis: Osteoarthritis, localized, shoulder, right [M19.011]  Osteoarthritis of right shoulder, unspecified osteoarthritis type [M19.011]  Procedure(s) (LRB):  RIGHT REVERSE TOTAL SHOULDER ARTHROPLASTY (GENERAL WITH BLOCK) (Right) 1 Day Post-Op     Precautions:                    ASSESSMENT :  Based on the objective data below, the patient is functioning mildly below her baseline due to NWB R shoulder, shoulder precautions, mild post op pain and decreased activity tolerance following admission for R reverse TSA (POD 1). PMH significant for COPD. She was received semisupine in bed, agreeable to participate and premedicated for pain. Reviewed shoulder precautions and pt verbalized understanding. She transferred supine>sit with CGA and demo'd good sitting balance. Pt participated in ADL training regarding UB dressing including sling management, LB dressing, toileting and bathing. She doffed/donned sling with setup and demo'd teachback of donning/doffing shirts. Pt able to perform AROM of hand and wrist, and performed pendulum exercises with verbal/visual cueing for technique. Pt demo'd no LOB with mobility in room, performing toilet transfer and brief standing grooming ADL with supervision-CGA. Pt did required 3 L O2 to recover after ambulating to bathroom (O2 sats 86%), RN informed. Pt had no further questions or concerns for acute OT and demonstrates good understanding of all provided education. Cleared by OT perspective to return home with family assistance when medically ready.    Functional Outcome Measure:  The patient scored 70/100 on the Barthel Index outcome measure.      Further skilled acute occupational therapy is not indicated at this time.     PLAN :    Recommendation for discharge: (in order for the patient to meet his/her long term goals):   Outpatient therapy when cleared by MD Pascal

## 2025-02-26 NOTE — PROGRESS NOTES
End of Shift Note    Bedside shift change report given to GENE Sood (oncoming nurse) by Mercy Johnson RN (offgoing nurse).  Report included the following information SBAR, Intake/Output, MAR, and Recent Results    Shift worked:  3319-3299     Shift summary and any significant changes:     PRN oxycodone given x2 for right shoulder pain. Dressing CDI. Up to chair multiple times and ambulating in the room.    On 4LNC, unable to wean today. Given breathing treatments as scheduled. Tolerating meals, not passing gas yet, only burping.      Concerns for physician to address:  None     Zone phone for oncoming shift:   [phone]       Activity:  Level of Assistance: Standby assist, set-up cues, supervision of patient - no hands on  Number times ambulated in hallways past shift: 0  Number of times OOB to chair past shift: 3    Cardiac:   Cardiac Monitoring: No      Cardiac Rhythm: Sinus rhythm    Access:  Current line(s): PIV     Genitourinary:   Urinary Status: Voiding, Bathroom privileges    Respiratory:   O2 Device: Nasal cannula  Chronic home O2 use?: NO  Incentive spirometer at bedside: YES    GI:  Last BM (including prior to admit): 02/24/25  Current diet:  ADULT DIET; Regular  Passing flatus: NO    Pain Management:   Patient states pain is manageable on current regimen: YES    Skin:  Demarcus Scale Score: 19  Interventions: Wound Offloading (Prevention Methods): Pillows, Repositioning    Patient Safety:  Fall Risk: Nursing Judgement-Fall Risk High(Add Comments): Yes  Fall Risk Interventions  Nursing Judgement-Fall Risk High(Add Comments): Yes  Toilet Every 2 Hours-In Advance of Need: Yes  Hourly Visual Checks: Awake  Fall Visual Posted: Armband, Fall sign posted  Room Door Open: Yes  Alarm On: Bed  Patient Moved Closer to Nursing Station: No    Active Consults:   IP CONSULT TO CASE MANAGEMENT  IP CONSULT TO HOSPITALIST    Length of Stay:  Expected LOS: 2  Actual LOS: 0    Mercy Johnson RN

## 2025-02-26 NOTE — CONSULTS
Hospitalist Admission Note    NAME:   Marty Villegas   : 1960   MRN: [id number]     Date/Time: 2025 11:29 AM    Patient PCP: Shayne Lay MD    ______________________________________________________________________  Given the patient's current clinical presentation, I have a high level of concern for decompensation if discharged from the emergency department.  Complex decision making was performed, which includes reviewing the patient's available past medical records, laboratory results, and x-ray films.       My assessment of this patient's clinical condition and my plan of care is as follows.    Assessment / Plan:      Acute hypoxic respiratory failure  Likely COPD exacerbation  Possible postop atelectasis  CHICHI on CPAP  Not on any a.m. home oxygen at baseline  Uses nightly oxygen with CPAP  Continue PTA inhalers  Continue Singulair  Scheduled DuoNeb  Solu-Medrol 60 IV once  Prednisone 40 x 5 days from tomorrow  Azithromycin 500 x 3 days  Wean off oxygen as tolerated to room air  Currently on 4 L nasal cannula  Portable chest  Continue incentive spirometry      Depression  Continue PTA Lexapro    Hypothyroidism   on Synthroid    Elevated blood pressure  Without history of hypertension  Likely secondary to pain  We will hold off antihypertensives  Hydralazine as needed for SBP above 170      Right shoulder arthropathy status post arthroplasty   Management per Ortho  Continue bowel regimen while on narcotic  Pain medication and DVT prophylaxis per Ortho      Medical Decision Making:   I personally reviewed labs: CBC BMP  I personally reviewed imaging: Chest x-ray pending  I personally reviewed EKG:  Toxic drug monitoring: n  Discussed case with: ED provider. After discussion I am in agreement that acuity of patient's medical condition necessitates hospital stay.      Code Status: Full code  DVT Prophylaxis: Per Ortho  Baseline:     Subjective:   Reason for consult medical

## 2025-02-26 NOTE — PROGRESS NOTES
End of Shift Note    Bedside shift change report given to GENE Castañeda (oncoming nurse) by Mercy Johnson RN (offgoing nurse).  Report included the following information SBAR, Intake/Output, MAR, and Recent Results    Shift worked:  5380-3287     Shift summary and any significant changes:     Received patient at 1800. Pt on 2LNC, O2 at 96%, tolerating well. PRN Morphine given x1 for shoulder pain along with Tylenol. Reported relief.  Ice applied for comfort. Sling used when out of bed. Able to ambulate to [facility] to void x1. BS active, no BM. Tolerating ice and water, but wanted to hold off on dinner by end of shift.      Concerns for physician to address:  None     Zone phone for oncoming shift:   [phone]       Activity:  Level of Assistance: Standby assist, set-up cues, supervision of patient - no hands on  Number times ambulated in hallways past shift: 0  Number of times OOB to chair past shift: 0    Cardiac:   Cardiac Monitoring: No      Cardiac Rhythm: Sinus rhythm    Access:  Current line(s): PIV     Genitourinary:   Urinary Status: Voiding, Bathroom privileges    Respiratory:   O2 Device: Nasal cannula  Chronic home O2 use?: YES  Incentive spirometer at bedside: YES    GI:     Current diet:  ADULT DIET; Regular  Passing flatus: YES    Pain Management:   Patient states pain is manageable on current regimen: YES    Skin:  Demarcus Scale Score: 21  Interventions: Wound Offloading (Prevention Methods): Pillows, Repositioning, Turning    Patient Safety:  Fall Risk:         Active Consults:   IP CONSULT TO CASE MANAGEMENT    Length of Stay:  Expected LOS: 1  Actual LOS: 0    Mercy Johnson RN

## 2025-02-26 NOTE — PROGRESS NOTES
Ortho / Neurosurgery NP Note    POD# 1  s/p RIGHT REVERSE TOTAL SHOULDER ARTHROPLASTY (GENERAL WITH BLOCK)   Pt seen with no visitor     Patient in chair  Reports post op pain 7/10. States her pain was uncontrolled overnight.   Patient admitted taking her oxycodone from her bag-states she had been asking for pain medication and could not wait any longer.   Given oxycodone 10 mg 0058, 0616, IV morphine 4 mg 2709, 0357.   Frustrated with post op course so far.   Has not yet had breakfast has not eaten since prior to surgery per the patient.       VSS Afebrile-3L nasal cannula    HX of severe COPD with exacerbations in Jan 2025 and again this month requiring antibiotics and steroids.-completed on 2/19. Followed by PAR.     Visit Vitals  BP (!) 140/73   Pulse 93   Temp 97.5 °F (36.4 °C) (Oral)   Resp 18   Ht 1.575 m (5' 2\")   Wt 81.1 kg (178 lb 12.7 oz)   SpO2 93%   BMI 32.70 kg/m²       Voiding status: voiding            Labs    Lab Results   Component Value Date/Time    HGB 12.0 02/26/2025 04:09 AM      Lab Results   Component Value Date/Time    INR 1.0 02/17/2025 01:16 PM      Lab Results   Component Value Date/Time     02/26/2025 04:09 AM    K 3.8 02/26/2025 04:09 AM     02/26/2025 04:09 AM    CO2 29 02/26/2025 04:09 AM    BUN 12 02/26/2025 04:09 AM     Recent Glucose Results:   Glucose   Date Value Ref Range Status   02/26/2025 125 (H) 65 - 100 mg/dL Final   02/17/2025 87 65 - 100 mg/dL Final   11/12/2022 120 (H) 65 - 100 mg/dL Final           Body mass index is 32.7 kg/m². : A BMI > 30 is classified as obesity and > 40 is classified as morbid obesity.     Awake and alert. No acute distress.  Appears somewhat agitated.   Dressing: Optifoam C.D.I. sling in place  No significant erythema or swelling  Cryotherapy in place over incision.   Calves soft and supple; No pain with passive stretch    LUE sensation to light touch intact. RUE numbness due to Exparel Block  LUE motor intact.  RUE in sling. Pulses

## 2025-02-26 NOTE — PLAN OF CARE
Problem: Safety - Adult  Goal: Free from fall injury  Outcome: Progressing  Flowsheets (Taken 2/26/2025 0501)  Free From Fall Injury: Instruct family/caregiver on patient safety     Problem: ABCDS Injury Assessment  Goal: Absence of physical injury  Outcome: Progressing  Flowsheets (Taken 2/26/2025 0501)  Absence of Physical Injury: Implement safety measures based on patient assessment     Problem: Pain  Goal: Verbalizes/displays adequate comfort level or baseline comfort level  Outcome: Progressing  Flowsheets (Taken 2/26/2025 0501)  Verbalizes/displays adequate comfort level or baseline comfort level: Encourage patient to monitor pain and request assistance

## 2025-02-26 NOTE — PROGRESS NOTES
End of Shift Note    Bedside shift change report given to Mercy (oncoming nurse) by Maddy Jean RN (offgoing nurse).  Report included the following information SBAR, Intake/Output, MAR, and Recent Results    Shift worked:  7p-7a     Shift summary and any significant changes:     Hypertensive but vitals otherwise stable, patient requested cpap and breathing treatments at start of shift, orders obtained and RT called, pain treated with morphine and oxycodone.  Patient took home oxycodone from her purse stating she did not feel like waiting, medication bottle sent for holding in pharmacy, Covering NP notified, patient instructed on dangers of taking medications from home and instructed to call RN when requesting pain medication. 4L nc while awake and cpap with 30% fio2 while asleep, up to commode with standby assist, voiding without difficulty, no bm this shift, dressing to right shoulder clean dry and intact, ice pack applied.  Patient had anxiety episode overnight due to cpap beeping, RN came to room and corrected beeping however patient was having \"panic attack\" and was feeling hot and stating she could not catch her breath, O2 saturation checked at 95%, Morphine and atarax given, cool washcloth applied to forehead, window opened and fan placed on bedside table to assist patient to cool off.  Patient then began yelling, Rn came to room and patient expressed frustration with not being able to control motion of bed, charge nurse ordered new bed (yet to arrive). Patient made comfortable in bed with call light in reach and instructions how to use.   Concerns for physician to address:       Zone phone for [facility] shift:   [phone]       Activity:     Number times ambulated in hallways past shift: 0  Number of times OOB to chair past shift: 0    Cardiac:   Cardiac Monitoring: No           Access:  Current line(s): PIV     Genitourinary:   Urinary status: voiding    Respiratory:      Chronic home O2 use?: YES  Incentive

## 2025-02-26 NOTE — PROGRESS NOTES
Found patient on room air, O2 sat 76%.  Placed patient on 4L, gave scheduled duoneb treatment.  O2 sat 92% on 4L,  RN aware

## 2025-02-27 VITALS
WEIGHT: 178.79 LBS | BODY MASS INDEX: 32.9 KG/M2 | SYSTOLIC BLOOD PRESSURE: 150 MMHG | RESPIRATION RATE: 18 BRPM | HEART RATE: 91 BPM | OXYGEN SATURATION: 97 % | DIASTOLIC BLOOD PRESSURE: 85 MMHG | TEMPERATURE: 98.1 F | HEIGHT: 62 IN

## 2025-02-27 LAB
GLUCOSE BLD STRIP.AUTO-MCNC: 118 MG/DL (ref 65–117)
SERVICE CMNT-IMP: ABNORMAL

## 2025-02-27 PROCEDURE — 6370000000 HC RX 637 (ALT 250 FOR IP): Performed by: INTERNAL MEDICINE

## 2025-02-27 PROCEDURE — G0378 HOSPITAL OBSERVATION PER HR: HCPCS

## 2025-02-27 PROCEDURE — 2500000003 HC RX 250 WO HCPCS: Performed by: ORTHOPAEDIC SURGERY

## 2025-02-27 PROCEDURE — 6370000000 HC RX 637 (ALT 250 FOR IP)

## 2025-02-27 PROCEDURE — 94660 CPAP INITIATION&MGMT: CPT

## 2025-02-27 PROCEDURE — 6370000000 HC RX 637 (ALT 250 FOR IP): Performed by: ORTHOPAEDIC SURGERY

## 2025-02-27 PROCEDURE — 94640 AIRWAY INHALATION TREATMENT: CPT

## 2025-02-27 PROCEDURE — 2700000000 HC OXYGEN THERAPY PER DAY

## 2025-02-27 PROCEDURE — 94761 N-INVAS EAR/PLS OXIMETRY MLT: CPT

## 2025-02-27 PROCEDURE — 6360000002 HC RX W HCPCS

## 2025-02-27 PROCEDURE — 82962 GLUCOSE BLOOD TEST: CPT

## 2025-02-27 RX ORDER — PREDNISONE 20 MG/1
40 TABLET ORAL DAILY
Qty: 12 TABLET | Refills: 0 | OUTPATIENT
Start: 2025-02-27 | End: 2025-03-09

## 2025-02-27 RX ORDER — AZITHROMYCIN 500 MG/1
500 TABLET, FILM COATED ORAL DAILY
Qty: 3 TABLET | Refills: 0 | Status: SHIPPED | OUTPATIENT
Start: 2025-02-27 | End: 2025-03-02

## 2025-02-27 RX ORDER — AZITHROMYCIN 500 MG/1
500 TABLET, FILM COATED ORAL DAILY
Qty: 1 PACKET | Refills: 0 | OUTPATIENT
Start: 2025-02-27 | End: 2025-03-02

## 2025-02-27 RX ORDER — PREDNISONE 20 MG/1
TABLET ORAL
Qty: 8 TABLET | Refills: 0 | Status: SHIPPED | OUTPATIENT
Start: 2025-02-28 | End: 2025-03-05

## 2025-02-27 RX ADMIN — FAMOTIDINE 20 MG: 20 TABLET, FILM COATED ORAL at 09:10

## 2025-02-27 RX ADMIN — SENNOSIDES AND DOCUSATE SODIUM 1 TABLET: 50; 8.6 TABLET ORAL at 09:10

## 2025-02-27 RX ADMIN — OXYCODONE 10 MG: 5 TABLET ORAL at 14:58

## 2025-02-27 RX ADMIN — PREDNISONE 40 MG: 20 TABLET ORAL at 09:09

## 2025-02-27 RX ADMIN — OXYCODONE 10 MG: 5 TABLET ORAL at 11:15

## 2025-02-27 RX ADMIN — ACETAMINOPHEN 650 MG: 325 TABLET ORAL at 09:09

## 2025-02-27 RX ADMIN — ESCITALOPRAM OXALATE 20 MG: 10 TABLET ORAL at 09:10

## 2025-02-27 RX ADMIN — AZITHROMYCIN 500 MG: 250 TABLET, FILM COATED ORAL at 14:58

## 2025-02-27 RX ADMIN — SODIUM CHLORIDE, PRESERVATIVE FREE 10 ML: 5 INJECTION INTRAVENOUS at 09:08

## 2025-02-27 RX ADMIN — ARFORMOTEROL TARTRATE: 15 SOLUTION RESPIRATORY (INHALATION) at 10:05

## 2025-02-27 RX ADMIN — IPRATROPIUM BROMIDE AND ALBUTEROL SULFATE 1 DOSE: 2.5; .5 SOLUTION RESPIRATORY (INHALATION) at 10:00

## 2025-02-27 RX ADMIN — ACETAMINOPHEN 650 MG: 325 TABLET ORAL at 02:29

## 2025-02-27 RX ADMIN — BISACODYL 5 MG: 5 TABLET, COATED ORAL at 09:10

## 2025-02-27 RX ADMIN — OXYCODONE 10 MG: 5 TABLET ORAL at 02:31

## 2025-02-27 RX ADMIN — LEVOTHYROXINE SODIUM 112 MCG: 0.11 TABLET ORAL at 09:10

## 2025-02-27 RX ADMIN — OXYCODONE 10 MG: 5 TABLET ORAL at 06:58

## 2025-02-27 RX ADMIN — IPRATROPIUM BROMIDE AND ALBUTEROL SULFATE 1 DOSE: 2.5; .5 SOLUTION RESPIRATORY (INHALATION) at 13:23

## 2025-02-27 ASSESSMENT — PAIN DESCRIPTION - LOCATION
LOCATION: SHOULDER
LOCATION: ARM;SHOULDER
LOCATION: SHOULDER;ARM;BACK
LOCATION: SHOULDER

## 2025-02-27 ASSESSMENT — PAIN SCALES - GENERAL
PAINLEVEL_OUTOF10: 7
PAINLEVEL_OUTOF10: 8
PAINLEVEL_OUTOF10: 8
PAINLEVEL_OUTOF10: 5
PAINLEVEL_OUTOF10: 7

## 2025-02-27 ASSESSMENT — PAIN DESCRIPTION - ORIENTATION
ORIENTATION: RIGHT
ORIENTATION: RIGHT

## 2025-02-27 ASSESSMENT — PAIN - FUNCTIONAL ASSESSMENT: PAIN_FUNCTIONAL_ASSESSMENT: PREVENTS OR INTERFERES SOME ACTIVE ACTIVITIES AND ADLS

## 2025-02-27 NOTE — PROGRESS NOTES
Patient discharged via wheelchair with brother, IV removed, AVS reviewed, patient received home Oxycodone from pharmacy, and all belongings with patient.

## 2025-02-27 NOTE — DISCHARGE SUMMARY
Ortho Discharge Summary    Patient ID:  Marty Villegas  [id number]  female  64 y.o.  1960    Admit date: 2/25/2025    Discharge date: 2/27/2025    Admitting Physician: Best Arana DO     Consulting Physician(s):   Treatment Team:   Best Arana DO Dyer, Dustin Chase, DO Scott, Theresa, RN Johnsen, Lisa Langford, Haley, LPN Pirouz, Babak, MD    Date of Surgery:   2/25/2025     Preoperative Diagnosis:  Osteoarthritis, localized, shoulder, right [M19.011]    Postoperative Diagnosis:   * No post-op diagnosis entered *    Procedure(s):   RIGHT REVERSE TOTAL SHOULDER ARTHROPLASTY (GENERAL WITH BLOCK)     Anesthesia Type:   General     Surgeon: Best Arana DO                            HPI:  Pt is a 64 y.o. female who has a history of Osteoarthritis, localized, shoulder, right [M19.011]  with pain and limitations of activities of daily living who presents at this time for a  RIGHT REVERSE TOTAL SHOULDER ARTHROPLASTY (GENERAL WITH BLOCK) following the failure of conservative management.    PMH:   Past Medical History:   Diagnosis Date    Arthritis     Asthma     Chronic obstructive pulmonary disease (HCC) PAH- 11/01/2021    Chronic pain     Depression     GERD (gastroesophageal reflux disease)     Not currently a problem    Graves disease     Nausea & vomiting 2020 - current    abdominal pain    Other ill-defined conditions(909.89)     thyroid dysfunction    PAH (pulmonary artery hypertension) (HCC)     SEES DR. ELLIOTT MCKENNA    PONV (postoperative nausea and vomiting)     Sleep apnea     CHICHI - SEES DR. BURKE ACEVEDO using CPAP    Stool color black        Body mass index is 32.7 kg/m². : A BMI > 30 is classified as obesity and > 40 is classified as morbid obesity.     Medications upon admission :   Prior to Admission Medications   Prescriptions Last Dose Informant Patient Reported? Taking?   Budesonide-Formoterol Fumarate (SYMBICORT IN) 2/24/2025  Yes Yes   Sig: Inhale into the lungs daily  Verified Results  TSH WITH REFLEX [id number] 10:05AM Judson CARLSON   [Aug 28, 2018 12:48PM MAGGY CARLSON]  TSH is normal indicating the thyroid function is in a good place     Test Name Result Flag Reference   TSH 1.018 mcUnits/mL  0.350-5.000   Findings most consistent with euthyroid state, no additional testing suggested. TSH may be normal in patients with thyroid dysfunction and pituitary disease. Clinical correlation recommended. (Reflex TSH algorithm is not recommended in hospitalized patients. A variety of drugs, as well as serious acute and chronic illnesses may alter thyroid function tests.  Commonly implicated drugs include glucocorticoids, dopamine, carbamazepine, iodine, amiodarone, lithium and heparin.)

## 2025-02-27 NOTE — CARE COORDINATION
ANTHONY note:    CM spoke with patient and her brother to bring her home O2 tank when he comes to  patient - she also has a pulmonologist Dr. Tito Adkins in [place] with [facility] and she will contact him to make sure she has a follow-up with him post hospital discharge. Patient to call brother to transport to at discharge. OSCAR VANCE, MSW  [phone]

## 2025-02-27 NOTE — PROGRESS NOTES
End of Shift Note    Bedside shift change report given to Karla HERRERA (oncoming nurse) by Karyna Ferreira RN (offgoing nurse).  Report included the following information SBAR, OR Summary, Intake/Output, MAR, and Recent Results      Activity:  Level of Assistance: Standby assist, set-up cues, supervision of patient - no hands on  Number times ambulated in hallways past shift: 0  Number of times OOB to chair past shift: 2    Cardiac:   Cardiac Monitoring: No      Cardiac Rhythm: Sinus rhythm    Access:  Current line(s): PIV     Genitourinary:   Urinary Status: Voiding, Bathroom privileges    Respiratory:   O2 Device: Nasal cannula  Chronic home O2 use?: YES  Incentive spirometer at bedside: YES    GI:  Last BM (including prior to admit): 02/24/25  Current diet:  ADULT DIET; Regular  Passing flatus: NO    Pain Management:   Patient states pain is manageable on current regimen: YES    Skin:  Demarcus Scale Score: 19  Interventions: Wound Offloading (Prevention Methods): Bed, pressure reduction mattress, Pillows, Repositioning    Patient Safety:  Fall Risk: Nursing Judgement-Fall Risk High(Add Comments): Yes  Fall Risk Interventions  Nursing Judgement-Fall Risk High(Add Comments): Yes  Toilet Every 2 Hours-In Advance of Need: Yes  Hourly Visual Checks: Awake, In bed, Quiet  Fall Visual Posted: Fall sign posted, Socks  Room Door Open: Deferred to promote rest  Alarm On: Other (Comment)  Patient Moved Closer to Nursing Station: No    Active Consults:   IP CONSULT TO CASE MANAGEMENT  IP CONSULT TO HOSPITALIST    Length of Stay:  Expected LOS: 2  Actual LOS: 0    Karyna Ferreira, RN

## 2025-02-27 NOTE — PROGRESS NOTES
Ortho / Neurosurgery NP Note    POD# 2  s/p RIGHT REVERSE TOTAL SHOULDER ARTHROPLASTY (GENERAL WITH BLOCK)   Pt seen with no visitor     Patient in bed.   States pain is better controlled today  Tolerating diet.   On 3L nasal cannula. Hospitalist following.  Eager to DC home today-states she has an oxygen tank at home.       VSS Afebrile-3L nasal cannula    HX of severe COPD with exacerbations in Jan 2025 and again this month requiring antibiotics and steroids.-completed on 2/19. Followed by PAR.     Visit Vitals  BP (!) 150/85   Pulse 91   Temp 98.1 °F (36.7 °C) (Oral)   Resp 18   Ht 1.575 m (5' 2\")   Wt 81.1 kg (178 lb 12.7 oz)   SpO2 97%   BMI 32.70 kg/m²       Voiding status: voiding            Labs    Lab Results   Component Value Date/Time    HGB 12.0 02/26/2025 04:09 AM      Lab Results   Component Value Date/Time    INR 1.0 02/17/2025 01:16 PM      Lab Results   Component Value Date/Time     02/26/2025 04:09 AM    K 3.8 02/26/2025 04:09 AM     02/26/2025 04:09 AM    CO2 29 02/26/2025 04:09 AM    BUN 12 02/26/2025 04:09 AM     Recent Glucose Results:   Glucose   Date Value Ref Range Status   02/26/2025 125 (H) 65 - 100 mg/dL Final   02/17/2025 87 65 - 100 mg/dL Final   11/12/2022 120 (H) 65 - 100 mg/dL Final           Body mass index is 32.7 kg/m². : A BMI > 30 is classified as obesity and > 40 is classified as morbid obesity.     Awake and alert. No acute distress.  Calm and cooperative.   Dressing: Optifoam C.D.I. sling in place  No significant erythema or swelling  Cryotherapy in place over incision.   Calves soft and supple; No pain with passive stretch    LUE sensation to light touch intact. RUE numbness due to Exparel Block  LUE motor intact.  RUE in sling. Pulses palpable.     SCD for mechanical DVT proph while in bed        PLAN:  1) Activity- OT-NWB RUE .   2) Pain control-continue Tylenol, oxycodone, cryotherapy.   3)hx of COPD, pulmonary htn, GERD-on 3L. Seen by [facility]

## (undated) DEVICE — GLOVE SURG SZ 8 L12IN FNGR THK79MIL GRN LTX FREE

## (undated) DEVICE — GLOVE ORANGE PI 7 1/2   MSG9075

## (undated) DEVICE — SUTURE V-LOC 180 SZ 0 L9IN ABSRB GRN GS-21 L37MM 1/2 CIR VLOCL0346

## (undated) DEVICE — BINDER ABD M/L H12IN FOR 46-62IN WHT 4 SLD PNL DSGN HOOP

## (undated) DEVICE — GLOVE SURG SZ 65 THK91MIL LTX FREE SYN POLYISOPRENE

## (undated) DEVICE — TOTAL JOINT-MRMC: Brand: MEDLINE INDUSTRIES, INC.

## (undated) DEVICE — SOLUTION SCRB 2OZ 10% POVIDONE IOD ANTIMIC BTL

## (undated) DEVICE — COVER,MAYO STAND,STERILE: Brand: MEDLINE

## (undated) DEVICE — GLOVE ORANGE PI 7   MSG9070

## (undated) DEVICE — DERMABOND SKIN ADH 0.7ML -- DERMABOND ADVANCED 12/BX

## (undated) DEVICE — SUTURE DEV SZ 3-0 V-LOC 90 L12IN TO L18IN CV-23 VLT VLOCM0844

## (undated) DEVICE — 2108 SERIES SAGITTAL BLADE (24.9 X 0.64 X 73.8MM)

## (undated) DEVICE — SOL IRR SOD CHL 0.9% TITAN XL CNTNR 3000ML

## (undated) DEVICE — OBTRTR BLDELSS 8MM DISP -- DA VINCI - SNGL USE

## (undated) DEVICE — HANDPIECE SET WITH COAXIAL HIGH FLOW TIP AND SUCTION TUBE: Brand: INTERPULSE

## (undated) DEVICE — SEAL

## (undated) DEVICE — FILTER TRP PLUME SMK EVAC

## (undated) DEVICE — DRILL KIT RVS 3.2 MM ERGO DISP

## (undated) DEVICE — SUTURE VICRYL SZ 0 L36IN ABSRB UD L36MM CT-1 1/2 CIR J946H

## (undated) DEVICE — LIQUIBAND RAPID ADHESIVE 36/CS 0.8ML: Brand: MEDLINE

## (undated) DEVICE — ARM DRAPE

## (undated) DEVICE — REDUCER CANN ENDOWRIST 12-8MM -- DA VINCI XI - SNGL USE

## (undated) DEVICE — INSTRUMENT KIT SHLDR HEX PIN GPS

## (undated) DEVICE — K WIRE FIX L6IN DIA0.062IN 1600662] MICROAIRE SURGICAL INSTRUMENTS INC]
Type: IMPLANTABLE DEVICE | Site: SHOULDER | Status: NON-FUNCTIONAL
Removed: 2025-02-25

## (undated) DEVICE — PACK,BASIC,SIRUS,V: Brand: MEDLINE

## (undated) DEVICE — GLOVE ORTHO 8   MSG9480

## (undated) DEVICE — SUTURE VICRYL SZ 2-0 L36IN ABSRB UD L36MM CT-1 1/2 CIR J945H

## (undated) DEVICE — SOLUTION IRRIG 1000ML 0.9% SOD CHL USP POUR PLAS BTL

## (undated) DEVICE — SYRINGE MED 30ML STD CLR PLAS LUERLOCK TIP N CTRL DISP

## (undated) DEVICE — SOLUTION IRRIG 1000ML H2O PIC PLAS SHATTERPROOF CONTAINER

## (undated) DEVICE — SUTURE DEV SZ 0 L6IN N ABSORBABLE

## (undated) DEVICE — SUTURE BARB NON ABSORBABLE V LOC PBT BLU SZ 0 LEN 9 IN GS 21 VLOCN0346

## (undated) DEVICE — SUTURE V-LOC SZ 0 L18IN NONABSORBABLE BLU L37MM GS-21 1/2 VLOCN0326

## (undated) DEVICE — SUTURE MONOCRYL STRATAFIX SPRL + SZ 3-0 L12IN ABSRB UD PS-2 SXMP1B106

## (undated) DEVICE — HYPODERMIC SAFETY NEEDLE: Brand: MAGELLAN

## (undated) DEVICE — GENERAL LAPAROSCOPY - SMH: Brand: MEDLINE INDUSTRIES, INC.

## (undated) DEVICE — SUTURE SZ 0 27IN 5/8 CIR UR-6  TAPER PT VIOLET ABSRB VICRYL J603H

## (undated) DEVICE — DEVICE SUT FOR TRCR SITE INCIS ENDO CLOSE

## (undated) DEVICE — SUT MCRYL 4-0 27IN PS2 UD --

## (undated) DEVICE — SUTURE ETHIBOND EXCEL SZ 5 L30IN NONABSORBABLE GRN L40MM V-37 MB66G

## (undated) DEVICE — REM POLYHESIVE ADULT PATIENT RETURN ELECTRODE: Brand: VALLEYLAB

## (undated) DEVICE — GLOVE SURG SZ 7 L12IN FNGR THK79MIL GRN LTX FREE

## (undated) DEVICE — 3M™ IOBAN™ 2 ANTIMICROBIAL INCISE DRAPE 6651EZ: Brand: IOBAN™ 2

## (undated) DEVICE — VISUALIZATION SYSTEM: Brand: CLEARIFY

## (undated) DEVICE — GARMENT,MEDLINE,DVT,INT,CALF,MED, GEN2: Brand: MEDLINE

## (undated) DEVICE — 450 ML BOTTLE OF 0.05% CHLORHEXIDINE GLUCONATE IN 99.95% STERILE WATER FOR IRRIGATION, USP AND APPLICATOR.: Brand: IRRISEPT ANTIMICROBIAL WOUND LAVAGE

## (undated) DEVICE — COVER MPLR TIP CRV SCIS ACC DA VINCI